# Patient Record
Sex: MALE | Race: WHITE | NOT HISPANIC OR LATINO | URBAN - METROPOLITAN AREA
[De-identification: names, ages, dates, MRNs, and addresses within clinical notes are randomized per-mention and may not be internally consistent; named-entity substitution may affect disease eponyms.]

---

## 2017-04-24 ENCOUNTER — OFFICE VISIT - RIVER FALLS (OUTPATIENT)
Dept: FAMILY MEDICINE | Facility: CLINIC | Age: 52
End: 2017-04-24

## 2017-04-25 LAB
CHOLEST SERPL-MCNC: 141 MG/DL (ref 125–200)
CHOLEST/HDLC SERPL: 2.2 {RATIO}
GLUCOSE BLD-MCNC: 98 MG/DL (ref 65–99)
HDLC SERPL-MCNC: 64 MG/DL
LDLC SERPL CALC-MCNC: 67 MG/DL
NONHDLC SERPL-MCNC: 77 MG/DL
TRIGL SERPL-MCNC: 48 MG/DL

## 2017-05-04 ENCOUNTER — OFFICE VISIT - RIVER FALLS (OUTPATIENT)
Dept: FAMILY MEDICINE | Facility: CLINIC | Age: 52
End: 2017-05-04

## 2017-05-04 ENCOUNTER — COMMUNICATION - RIVER FALLS (OUTPATIENT)
Dept: FAMILY MEDICINE | Facility: CLINIC | Age: 52
End: 2017-05-04

## 2017-05-04 ASSESSMENT — MIFFLIN-ST. JEOR: SCORE: 1560.88

## 2017-05-05 LAB — HBA1C MFR BLD: 6.1 %

## 2017-06-06 ENCOUNTER — OFFICE VISIT - RIVER FALLS (OUTPATIENT)
Dept: FAMILY MEDICINE | Facility: CLINIC | Age: 52
End: 2017-06-06

## 2017-06-06 ASSESSMENT — MIFFLIN-ST. JEOR: SCORE: 1539.11

## 2017-07-19 ENCOUNTER — AMBULATORY - HEALTHEAST (OUTPATIENT)
Dept: CARDIOLOGY | Facility: CLINIC | Age: 52
End: 2017-07-19

## 2017-07-19 ENCOUNTER — RECORDS - HEALTHEAST (OUTPATIENT)
Dept: ADMINISTRATIVE | Facility: OTHER | Age: 52
End: 2017-07-19

## 2017-07-25 ENCOUNTER — RECORDS - HEALTHEAST (OUTPATIENT)
Dept: ADMINISTRATIVE | Facility: OTHER | Age: 52
End: 2017-07-25

## 2017-07-25 ENCOUNTER — COMMUNICATION - HEALTHEAST (OUTPATIENT)
Dept: TELEHEALTH | Facility: CLINIC | Age: 52
End: 2017-07-25

## 2017-07-25 ENCOUNTER — OFFICE VISIT - RIVER FALLS (OUTPATIENT)
Dept: FAMILY MEDICINE | Facility: CLINIC | Age: 52
End: 2017-07-25

## 2017-07-26 ENCOUNTER — OFFICE VISIT - HEALTHEAST (OUTPATIENT)
Dept: CARDIOLOGY | Facility: TELEHEALTH | Age: 52
End: 2017-07-26

## 2017-07-26 DIAGNOSIS — R93.1 ELEVATED CORONARY ARTERY CALCIUM SCORE: ICD-10-CM

## 2017-07-26 DIAGNOSIS — I25.10 CORONARY ARTERY DISEASE INVOLVING NATIVE CORONARY ARTERY WITHOUT ANGINA PECTORIS: ICD-10-CM

## 2017-07-26 RX ORDER — CHLORAL HYDRATE 500 MG
2 CAPSULE ORAL DAILY
Status: SHIPPED | COMMUNITY
Start: 2017-07-26

## 2017-07-26 RX ORDER — ATORVASTATIN CALCIUM 80 MG/1
80 TABLET, FILM COATED ORAL AT BEDTIME
Status: SHIPPED | COMMUNITY
Start: 2017-07-26

## 2017-07-26 RX ORDER — UBIDECARENONE 200 MG
200 CAPSULE ORAL DAILY
Status: SHIPPED | COMMUNITY
Start: 2017-07-26

## 2017-07-26 RX ORDER — SODIUM PHOSPHATE,MONO-DIBASIC 19G-7G/118
1 ENEMA (ML) RECTAL DAILY
Status: SHIPPED | COMMUNITY
Start: 2017-07-26

## 2017-07-26 ASSESSMENT — MIFFLIN-ST. JEOR: SCORE: 1544.79

## 2017-09-11 ENCOUNTER — HOSPITAL ENCOUNTER (OUTPATIENT)
Dept: CARDIOLOGY | Facility: CLINIC | Age: 52
Discharge: HOME | End: 2017-09-11
Attending: INTERNAL MEDICINE

## 2017-09-11 DIAGNOSIS — I25.10 CORONARY ARTERY DISEASE INVOLVING NATIVE CORONARY ARTERY WITHOUT ANGINA PECTORIS: ICD-10-CM

## 2017-09-11 DIAGNOSIS — R93.1 ELEVATED CORONARY ARTERY CALCIUM SCORE: ICD-10-CM

## 2017-09-11 LAB
CV STRESS MAX HR HE: 150
ECHO EJECTION FRACTION ESTIMATED: 60 %
STRESS ECHO BASELINE BP: NORMAL M/S
STRESS ECHO BASELINE HR: 65 BPM
STRESS ECHO CALCULATED PERCENT HR: 89 %
STRESS ECHO LAST STRESS BP: NORMAL M/S
STRESS ECHO POST ESTIMATED WORKLOAD: 12.9 METS
STRESS ECHO POST EXERCISE DUR MIN: 12 MIN
STRESS ECHO POST EXERCISE DUR SEC: 30 SEC
STRESS ECHO TARGET HR: 144

## 2017-10-16 ENCOUNTER — AMBULATORY - RIVER FALLS (OUTPATIENT)
Dept: FAMILY MEDICINE | Facility: CLINIC | Age: 52
End: 2017-10-16

## 2018-04-23 ENCOUNTER — AMBULATORY - RIVER FALLS (OUTPATIENT)
Dept: FAMILY MEDICINE | Facility: CLINIC | Age: 53
End: 2018-04-23

## 2018-04-24 LAB
CHOLEST SERPL-MCNC: 112 MG/DL
CHOLEST/HDLC SERPL: 2 {RATIO}
GLUCOSE BLD-MCNC: 105 MG/DL (ref 65–99)
HBA1C MFR BLD: 5.8 %
HDLC SERPL-MCNC: 57 MG/DL
LDLC SERPL CALC-MCNC: 42 MG/DL
NONHDLC SERPL-MCNC: 55 MG/DL
TRIGL SERPL-MCNC: 47 MG/DL

## 2018-05-08 ENCOUNTER — OFFICE VISIT - RIVER FALLS (OUTPATIENT)
Dept: FAMILY MEDICINE | Facility: CLINIC | Age: 53
End: 2018-05-08

## 2018-05-08 ASSESSMENT — MIFFLIN-ST. JEOR: SCORE: 1557.25

## 2018-05-09 LAB — PSA SERPL-MCNC: 1 NG/ML

## 2018-06-12 ENCOUNTER — OFFICE VISIT - RIVER FALLS (OUTPATIENT)
Dept: FAMILY MEDICINE | Facility: CLINIC | Age: 53
End: 2018-06-12

## 2018-06-14 ENCOUNTER — OFFICE VISIT - RIVER FALLS (OUTPATIENT)
Dept: FAMILY MEDICINE | Facility: CLINIC | Age: 53
End: 2018-06-14

## 2018-06-25 ENCOUNTER — OFFICE VISIT - RIVER FALLS (OUTPATIENT)
Dept: FAMILY MEDICINE | Facility: CLINIC | Age: 53
End: 2018-06-25

## 2018-10-18 ENCOUNTER — AMBULATORY - RIVER FALLS (OUTPATIENT)
Dept: FAMILY MEDICINE | Facility: CLINIC | Age: 53
End: 2018-10-18

## 2019-05-02 ENCOUNTER — AMBULATORY - RIVER FALLS (OUTPATIENT)
Dept: FAMILY MEDICINE | Facility: CLINIC | Age: 54
End: 2019-05-02

## 2019-05-03 ENCOUNTER — COMMUNICATION - RIVER FALLS (OUTPATIENT)
Dept: FAMILY MEDICINE | Facility: CLINIC | Age: 54
End: 2019-05-03

## 2019-05-03 LAB
CHOLEST SERPL-MCNC: 117 MG/DL
CHOLEST/HDLC SERPL: 1.8 {RATIO}
GLUCOSE BLD-MCNC: 97 MG/DL (ref 65–99)
HBA1C MFR BLD: 5.9 %
HDLC SERPL-MCNC: 66 MG/DL
LDLC SERPL CALC-MCNC: 38 MG/DL
NONHDLC SERPL-MCNC: 51 MG/DL
TRIGL SERPL-MCNC: 45 MG/DL

## 2019-05-10 ENCOUNTER — COMMUNICATION - HEALTHEAST (OUTPATIENT)
Dept: ADMINISTRATIVE | Facility: CLINIC | Age: 54
End: 2019-05-10

## 2019-05-10 ENCOUNTER — OFFICE VISIT - RIVER FALLS (OUTPATIENT)
Dept: FAMILY MEDICINE | Facility: CLINIC | Age: 54
End: 2019-05-10

## 2019-05-10 ASSESSMENT — MIFFLIN-ST. JEOR: SCORE: 1560.88

## 2019-06-03 ENCOUNTER — COMMUNICATION - RIVER FALLS (OUTPATIENT)
Dept: FAMILY MEDICINE | Facility: CLINIC | Age: 54
End: 2019-06-03

## 2019-06-24 ENCOUNTER — AMBULATORY - HEALTHEAST (OUTPATIENT)
Dept: CARDIOLOGY | Facility: CLINIC | Age: 54
End: 2019-06-24

## 2019-06-24 ENCOUNTER — RECORDS - HEALTHEAST (OUTPATIENT)
Dept: ADMINISTRATIVE | Facility: OTHER | Age: 54
End: 2019-06-24

## 2019-06-26 ENCOUNTER — OFFICE VISIT - HEALTHEAST (OUTPATIENT)
Dept: CARDIOLOGY | Facility: TELEHEALTH | Age: 54
End: 2019-06-26

## 2019-06-26 DIAGNOSIS — R93.1 ELEVATED CORONARY ARTERY CALCIUM SCORE: ICD-10-CM

## 2019-06-26 RX ORDER — ALFUZOSIN HYDROCHLORIDE 10 MG/1
10 TABLET, EXTENDED RELEASE ORAL DAILY
Status: SHIPPED | COMMUNITY
Start: 2018-05-08

## 2019-06-26 RX ORDER — SIMVASTATIN 20 MG
20 TABLET ORAL DAILY
Status: SHIPPED | COMMUNITY
Start: 2019-06-26

## 2019-06-26 ASSESSMENT — MIFFLIN-ST. JEOR: SCORE: 1567.47

## 2019-10-03 ENCOUNTER — AMBULATORY - RIVER FALLS (OUTPATIENT)
Dept: FAMILY MEDICINE | Facility: CLINIC | Age: 54
End: 2019-10-03

## 2019-10-03 ENCOUNTER — COMMUNICATION - RIVER FALLS (OUTPATIENT)
Dept: FAMILY MEDICINE | Facility: CLINIC | Age: 54
End: 2019-10-03

## 2019-12-03 ENCOUNTER — OFFICE VISIT - RIVER FALLS (OUTPATIENT)
Dept: FAMILY MEDICINE | Facility: CLINIC | Age: 54
End: 2019-12-03

## 2019-12-03 ASSESSMENT — MIFFLIN-ST. JEOR: SCORE: 1559.07

## 2019-12-04 ENCOUNTER — COMMUNICATION - RIVER FALLS (OUTPATIENT)
Dept: FAMILY MEDICINE | Facility: CLINIC | Age: 54
End: 2019-12-04

## 2019-12-04 LAB
BUN SERPL-MCNC: 16 MG/DL (ref 7–25)
BUN/CREAT RATIO - HISTORICAL: ABNORMAL (ref 6–22)
CALCIUM SERPL-MCNC: 9.4 MG/DL (ref 8.6–10.3)
CHLORIDE BLD-SCNC: 103 MMOL/L (ref 98–110)
CO2 SERPL-SCNC: 29 MMOL/L (ref 20–32)
CREAT SERPL-MCNC: 0.73 MG/DL (ref 0.7–1.33)
EGFRCR SERPLBLD CKD-EPI 2021: 106 ML/MIN/1.73M2
GLUCOSE BLD-MCNC: 109 MG/DL (ref 65–99)
POTASSIUM BLD-SCNC: 4.2 MMOL/L (ref 3.5–5.3)
SODIUM SERPL-SCNC: 138 MMOL/L (ref 135–146)

## 2020-02-15 ENCOUNTER — OFFICE VISIT - RIVER FALLS (OUTPATIENT)
Dept: FAMILY MEDICINE | Facility: CLINIC | Age: 55
End: 2020-02-15

## 2020-02-15 ASSESSMENT — MIFFLIN-ST. JEOR: SCORE: 1573.58

## 2020-02-28 ENCOUNTER — AMBULATORY - RIVER FALLS (OUTPATIENT)
Dept: FAMILY MEDICINE | Facility: CLINIC | Age: 55
End: 2020-02-28

## 2020-03-20 ENCOUNTER — COMMUNICATION - RIVER FALLS (OUTPATIENT)
Dept: FAMILY MEDICINE | Facility: CLINIC | Age: 55
End: 2020-03-20

## 2020-05-22 ENCOUNTER — OFFICE VISIT - RIVER FALLS (OUTPATIENT)
Dept: FAMILY MEDICINE | Facility: CLINIC | Age: 55
End: 2020-05-22

## 2020-05-22 ASSESSMENT — MIFFLIN-ST. JEOR: SCORE: 1554.53

## 2020-05-23 LAB
CHOLEST SERPL-MCNC: 121 MG/DL
CHOLEST/HDLC SERPL: 1.8 {RATIO}
HBA1C MFR BLD: 6 %
HDLC SERPL-MCNC: 66 MG/DL
LDLC SERPL CALC-MCNC: 42 MG/DL
NONHDLC SERPL-MCNC: 55 MG/DL
TRIGL SERPL-MCNC: 46 MG/DL

## 2020-05-24 ENCOUNTER — COMMUNICATION - RIVER FALLS (OUTPATIENT)
Dept: FAMILY MEDICINE | Facility: CLINIC | Age: 55
End: 2020-05-24

## 2020-09-16 ENCOUNTER — AMBULATORY - RIVER FALLS (OUTPATIENT)
Dept: FAMILY MEDICINE | Facility: CLINIC | Age: 55
End: 2020-09-16

## 2020-12-11 ENCOUNTER — OFFICE VISIT - RIVER FALLS (OUTPATIENT)
Dept: FAMILY MEDICINE | Facility: CLINIC | Age: 55
End: 2020-12-11

## 2020-12-11 ASSESSMENT — MIFFLIN-ST. JEOR: SCORE: 1561.79

## 2021-05-30 NOTE — PROGRESS NOTES
Patient with known history of calcium score which is elevated most of which is in the left anterior descending coronary artery.  He had a stress echo bushra Wheatley 2 years ago which is unremarkable.    He has been feeling well.  He averages 15,000 steps a day primarily by walking his dogs.  Is not been working out on a regular basis but has had difficulties with his Achilles tendon and is considering surgery for it.  He denies orthopnea, paroxysmal nocturnal dyspnea, peripheral edema, syncope or near syncopal episodes.  He has no chest discomfort or chest tightness with or without activity.    LDL cholesterol is quite low, measuring 38 recently.  He has no side effects from the statins that he is aware of.  Her graph vital signs are reviewed.    Chest is clear to auscultation.    On cardiovascular exam there is no increased secondary to pressure nor is her hepatojugular reflux.  There is an S1 and S2 without murmur clicks or rubs.  Radial brachial pulses are easily palpable and symmetric as of the carotid pulses and there are no carotid bruits.  Posterior tibial pulses are easily palpable and symmetric.    No abdominal bruits.    Extremities no peripheral edema.    Patient is doing well from a cardiovascular standpoint.  We did talk about the Mediterranean diet which he and his spouse have researched and are ready to undertake with just have not gotten started.  I commended him and recommended he do this.  I also gave him the name of the book called the signs of a long life by Dr. My Luciano which incorporates many of the strategies for preventing cardiac events and a long life.  I think this would be a useful book for him.    I am not changing his medications today.  We will see him back in 2 years, but of course would be happy to see him sooner if questions or problems arise.    Thank you for allowing us to participate in his care

## 2021-05-31 VITALS — HEIGHT: 66 IN | WEIGHT: 168 LBS | BODY MASS INDEX: 27 KG/M2

## 2021-06-03 VITALS — BODY MASS INDEX: 27.8 KG/M2 | HEIGHT: 66 IN | WEIGHT: 173 LBS

## 2021-06-16 PROBLEM — I25.10 CORONARY ARTERY DISEASE INVOLVING NATIVE CORONARY ARTERY WITHOUT ANGINA PECTORIS: Status: ACTIVE | Noted: 2017-07-26

## 2021-06-16 PROBLEM — R93.1 ELEVATED CORONARY ARTERY CALCIUM SCORE: Status: ACTIVE | Noted: 2017-07-26

## 2021-06-19 NOTE — LETTER
Letter by Andrea Ugarte MD at      Author: Andrea Ugarte MD Service: -- Author Type: --    Filed:  Encounter Date: 5/10/2019 Status: (Other)         Damon Davis  [address] [facility] [address]      May 10, 2019      Dear Maico,    This letter is to remind you that you will be due for your follow up appointment with Dr. Andrea Ugarte  . To help ensure you are in the best health possible, a regular follow-up with your cardiologist is essential.     Please call our Patient Scheduling Line at [phone] to schedule your appointment at your earliest convenience.  If you have recently scheduled an appointment, please disregard this letter.    We look forward to seeing you again. As always, we are available at the number  above for any questions or concerns you may have.      Sincerely,     The Physicians and Staff of [facility]

## 2021-06-25 NOTE — PROGRESS NOTES
Progress Notes by Andrea Ugarte MD at 7/26/2017  8:30 AM     Author: Andrea Ugarte MD Service: -- Author Type: Physician    Filed: 7/26/2017  9:18 AM Encounter Date: 7/26/2017 Status: Signed    : Andrea Ugarte MD (Physician)           Click to link to [facility] HEART CARE NOTE    Thank you, Dr. Sandra, for asking us to see Maico Cerda at the [facility].      Assessment/Recommendations   Patient with elevated calcium score which is increased over the last 9 years.  It is not dramatically increased and there is some suggestion that statins may increase the calcium score in patients with known plaque.  The calcium is all located in the left anterior descending coronary artery.  He is not having any symptoms but admits to being more sedentary and his exercise is brief walks with his 2 dogs.    We discussed risk factor modification including statins, aspirin, no tobacco use, all of which he is currently doing.  We talked about increasing his physical activity and I recommended more vigorous exercise for 30 minutes at least 5 times a week.  We also talked about diet and I have asked him to reduce his carbohydrates by 50% and substitute protein.  I think this would result in better blood sugar as well as some weight loss.    Because of the same all in the left anterior descending coronary artery, I have recommended that we do a stress echocardiogram as it is been several years since he has had a stress test.  This will also make me more comfortable and pushing him on the exercise.    If he remains stable and his stress echocardiogram is unremarkable, I will see him back in 2 years but a course would be happy to see him sooner if questions or problems arise.         History of Present Illness    Mr. Maico Cerda is a 51 y.o. male with known high calcium score and family history of premature coronary artery disease.  His father had bypass surgery in  "his 50s.  His maternal grandfather also had coronary artery disease in his maternal uncle had significant cardiac event at age 50.  Both of his parents are treated for hypertension.  The patient has never had extreme hyperlipidemia and is been on simvastatin for several years.  He has never smoked cigarettes, is \"prediabetic\".  Has not been treated for hypertension.  Blood pressures generally run well.    He had a calcium score in 2008 which was in the 150s and a repeat recently which was in the 180s.  The calcium was in the left anterior descending coronary artery.    He has not had any symptoms.  He walks his dogs a half a mile to a mile a couple of times a day and a shorter walk at lunchtime.  He does not have any symptoms with walking.  He does yard work without symptoms.  In the past he is done a treadmill and an exercise bike more vigorously but has not done much of that since January.  He is just gotten out of the habit.    He denies orthopnea, paroxysmal nocturnal dyspnea, peripheral edema, syncope, near syncope and has no history of rheumatic fever, cerebrovascular accident, or TIA.  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  He is  and has 1 daughter who will be attending college this fall.         Physical Examination Review of Systems   Vitals:    07/26/17 0824   BP: 100/62   Pulse: 68   Resp: 16     Body mass index is 27.12 kg/(m^2).  Wt Readings from Last 3 Encounters:   07/26/17 168 lb (76.2 kg)     General Appearance:   Alert, cooperative and in no acute distress.   ENT/Mouth: Oral mucuos membranes pink and moist .      EYES:  No scleral icterus. No Xanthelasma.    Neck: JVP normal. No Hepatojugular reflux. Thyroid not visualized   Chest/Lungs:   Lungs are clear to auscultation, equal chest wall expansion    Cardiovascular:   S1, S2 without murmur ,clicks or rubs. Brachial, radial and posterior tibial pulses are intact and symetric. No carotid bruits noted   Abdomen:  Nontender. BS+. " No bruits.      Extremities: No cyanosis, clubbing or edema   Skin: no xanthelasma, warm.    Psych: Appropriate affect.   Neurologic: normal gait, normal  bilateral, no tremors        General: WNL  Eyes: WNL  Ears/Nose/Throat: WNL  Lungs: WNL  Heart: WNL  Stomach: WNL  Bladder: WNL  Muscle/Joints: WNL  Skin: WNL  Nervous System: WNL  Mental Health: WNL     Blood: WNL     Medical History  Surgical History Family History Social History   No past medical history on file. No past surgical history on file. Family History   Problem Relation Age of Onset   ? Hypertension Mother    ? CABG Father     Social History     Social History   ? Marital status:      Spouse name: N/A   ? Number of children: N/A   ? Years of education: N/A     Occupational History   ? Not on file.     Social History Main Topics   ? Smoking status: Never Smoker   ? Smokeless tobacco: Not on file   ? Alcohol use Not on file   ? Drug use: Not on file   ? Sexual activity: Not on file     Other Topics Concern   ? Not on file     Social History Narrative   ? No narrative on file          Medications  Allergies   Current Outpatient Prescriptions   Medication Sig Dispense Refill   ? aspirin 81 MG EC tablet Take 81 mg by mouth daily.     ? atorvastatin (LIPITOR) 80 MG tablet Take 80 mg by mouth at bedtime.     ? coenzyme Q10 (CO Q-10) 200 mg capsule Take 200 mg by mouth daily.     ? glucosamine-chondroitin 500-400 mg cap Take 1 capsule by mouth daily.     ? magnesium oxide 250 mg Tab Take 250 mg by mouth daily.     ? MULTIVITAMIN ORAL Take 1 tablet by mouth daily.     ? OMEGA-3/DHA/EPA/FISH OIL (FISH OIL-OMEGA-3 FATTY ACIDS) 300-1,000 mg capsule Take 2 g by mouth daily.     ? triamcinolone (NASACORT) 55 mcg nasal inhaler 2 sprays into each nostril as needed.       No current facility-administered medications for this visit.       No Known Allergies      Lab Results    Chemistry/lipid CBC Cardiac Enzymes/BNP/TSH/INR   No results found for: CHOL, HDL,  LDLCALC, TRIG, CREATININE, BUN, K, NA, CL, CO2 No results found for: WBC, HGB, HCT, MCV, PLT No results found for: CKTOTAL, CKMB, CKMBINDEX, TROPONINI, BNP, TSH, INR

## 2021-07-13 ENCOUNTER — COMMUNICATION - RIVER FALLS (OUTPATIENT)
Dept: FAMILY MEDICINE | Facility: CLINIC | Age: 56
End: 2021-07-13

## 2022-02-11 VITALS — HEART RATE: 55 BPM | DIASTOLIC BLOOD PRESSURE: 68 MMHG | SYSTOLIC BLOOD PRESSURE: 110 MMHG

## 2022-02-11 VITALS
SYSTOLIC BLOOD PRESSURE: 162 MMHG | TEMPERATURE: 98 F | HEART RATE: 54 BPM | HEART RATE: 58 BPM | BODY MASS INDEX: 28.73 KG/M2 | DIASTOLIC BLOOD PRESSURE: 73 MMHG | SYSTOLIC BLOOD PRESSURE: 126 MMHG | DIASTOLIC BLOOD PRESSURE: 82 MMHG | TEMPERATURE: 98.2 F | WEIGHT: 178 LBS

## 2022-02-12 VITALS
WEIGHT: 173.6 LBS | SYSTOLIC BLOOD PRESSURE: 127 MMHG | BODY MASS INDEX: 27.9 KG/M2 | HEIGHT: 66 IN | HEART RATE: 58 BPM | DIASTOLIC BLOOD PRESSURE: 80 MMHG | TEMPERATURE: 97.5 F

## 2022-02-12 VITALS
DIASTOLIC BLOOD PRESSURE: 75 MMHG | HEIGHT: 66 IN | BODY MASS INDEX: 28.48 KG/M2 | BODY MASS INDEX: 27.97 KG/M2 | TEMPERATURE: 97.4 F | WEIGHT: 177.2 LBS | HEART RATE: 67 BPM | HEIGHT: 66 IN | SYSTOLIC BLOOD PRESSURE: 122 MMHG | WEIGHT: 174 LBS | HEART RATE: 66 BPM | SYSTOLIC BLOOD PRESSURE: 115 MMHG | OXYGEN SATURATION: 98 % | DIASTOLIC BLOOD PRESSURE: 72 MMHG | TEMPERATURE: 97.1 F

## 2022-02-12 VITALS
SYSTOLIC BLOOD PRESSURE: 121 MMHG | HEART RATE: 57 BPM | TEMPERATURE: 97.2 F | HEIGHT: 66 IN | BODY MASS INDEX: 28.03 KG/M2 | WEIGHT: 174.4 LBS | DIASTOLIC BLOOD PRESSURE: 78 MMHG

## 2022-02-12 VITALS
WEIGHT: 174.4 LBS | DIASTOLIC BLOOD PRESSURE: 66 MMHG | BODY MASS INDEX: 28.03 KG/M2 | DIASTOLIC BLOOD PRESSURE: 60 MMHG | HEART RATE: 58 BPM | SYSTOLIC BLOOD PRESSURE: 108 MMHG | TEMPERATURE: 97.2 F | SYSTOLIC BLOOD PRESSURE: 104 MMHG | HEIGHT: 66 IN

## 2022-02-12 VITALS
HEART RATE: 66 BPM | SYSTOLIC BLOOD PRESSURE: 127 MMHG | HEIGHT: 66 IN | WEIGHT: 174.6 LBS | BODY MASS INDEX: 28.06 KG/M2 | DIASTOLIC BLOOD PRESSURE: 77 MMHG

## 2022-02-12 VITALS
HEIGHT: 66 IN | WEIGHT: 169.6 LBS | BODY MASS INDEX: 27.26 KG/M2 | TEMPERATURE: 97.6 F | HEART RATE: 62 BPM | DIASTOLIC BLOOD PRESSURE: 72 MMHG | SYSTOLIC BLOOD PRESSURE: 111 MMHG

## 2022-02-12 VITALS
TEMPERATURE: 97.1 F | SYSTOLIC BLOOD PRESSURE: 121 MMHG | WEIGHT: 173 LBS | HEART RATE: 57 BPM | HEIGHT: 66 IN | DIASTOLIC BLOOD PRESSURE: 74 MMHG | BODY MASS INDEX: 27.8 KG/M2

## 2022-02-15 NOTE — PROGRESS NOTES
Patient:   YASMIN SILVESTRE            MRN: [id number]            FIN: [id number]               Age:   54 years     Sex:  Male     :  1965   Associated Diagnoses:   Skin infection   Author:   Sarahy Rogers      Visit Information      Date of Service: 02/15/2020 08:09 am  Performing Location: [facility]  Encounter#: [id number]      Primary Care Provider (PCP):  David Sandra MD, NPI# [id number]      Referring Provider:  Sarahy Rogers    NPI# [id number]      Chief Complaint   2/15/2020 8:13 AM CST    Possible infected surgical site left foot.  Surgery was 20 for achelles tendon repair        History of Present Illness   Had left achilles tendon repair 8 weeks ago  2 weeks later some sutures removed  2 weeks ago final sutures removed over the site where there were some scabs  he is concerned there is an infection as it is draining pus  no increased pain  he is full wt bearing and has been allowed to be in a pool and tube, tho he has only been showering  no fever  surgeon Dr Martin from [facility]  here on Sat at urgent clinic      Health Status   Allergies:    Allergic Reactions (Selected)  No known allergies   Medications:  (Selected)   Prescriptions  Prescribed  Flomax 0.4 mg oral capsule: = 1 cap(s) ( 0.4 mg ), Oral, daily, # 90 cap(s), 1 Refill(s), Type: Maintenance, Pharmacy: [facility], 1 cap(s) Oral daily  aspirin 81 mg oral tablet: 1 tab(s) ( 81 mg ), PO, Daily, # 30 tab(s), 0 Refill(s), Type: Soft Stop, OTC (Rx)  atorvastatin 80 mg oral tablet: = 1 tab(s) ( 80 mg ), Oral, daily, # 90 tab(s), 1 Refill(s), Type: Maintenance, Pharmacy: [facility], 1 tab(s) Oral daily  cephalexin 500 mg oral capsule: = 1 cap(s) ( 500 mg ), Oral, tid, x 7 day(s), # 21 cap(s), 0 Refill(s), Type: Acute, Pharmacy: [id number], 1 cap(s) Oral tid,x7 day(s)  Documented Medications  Documented  Coenzyme Q10: ( 200 mg ), po, daily, cap(s), 0  Refill(s), Type: Maintenance  Fish Oil: 1 tab(s), po, bid, tab(s), 0 Refill(s), Type: Maintenance  Melatonin 3 mg oral tablet: = 1 tab(s) ( 3 mg ), Oral, 0 Refill(s), Type: Maintenance  Multiple Vitamins oral tablet: 1 tab(s), po, daily, 0 Refill(s), Type: Maintenance  Nasacort: daily, 0 Refill(s), Type: Maintenance  glucosamine: PO, Daily, 0   Problem list:    All Problems  ASHD (arteriosclerotic heart disease) / SNOMED CT 61042256 / Confirmed  HLD (hyperlipidemia) / SNOMED CT 517088345 / Confirmed  Joint pain / SNOMED CT 80368938 / Confirmed  Patient Comment: Tightness & pain in achilles tendons  Kidney stone / SNOMED CT 775908950 / Confirmed  Prediabetes / SNOMED CT 77127608 / Confirmed  Seasonal allergies / SNOMED CT 560492899 / Confirmed  Tennis elbow / SNOMED CT 603973738 / Confirmed  Urinary incontinence / SNOMED CT 2500862670 / Confirmed  Managing Provider: -  Patient Comment: I would like to discuss this at my upcoming physical.  Resolved: History of chicken pox / SNOMED CT 527437624  Canceled: Hx of abnormal cardiac calcium score      Histories   Past Medical History:    Active  Tennis elbow (749431394)  ASHD (arteriosclerotic heart disease) (98045820)  HLD (hyperlipidemia) (360940347)  Seasonal allergies (341753215)  Resolved  History of chicken pox (382351037):  Resolved.   Family History:    Diabetes mellitus  Father (Randy)  Hypertension  Mother (Erika)  Heart disease  Mother (Erika)  Cancer of colon  Grandfather (P)  Comments:  4/19/2011 7:27 PM CDT - Ebla Lindsay  80s  Crohn's disease  Sister (Anjelica)  MI - Myocardial infarction  Uncle (P)  Comments:  4/19/2011 7:27 PM CDT - Elba Lindsay  50s  Father (Randy)  Comments:  4/19/2011 7:29 PM CDT - Elba Lindsay  early 50s  ASHD - Atherosclerotic heart disease  Father (Randy)     Procedure history:    Colonoscopy (SNOMED CT 211939146) performed by Nacho Villalobos MD on 7/25/2017 at 51 Years.  Comments:  8/1/2017 10:38 AM RENETTA Vega RN,  Silvana  Indication: screening  Sedation: fentanyl and versed  Findings: normal colonoscopy  F/U: 10 years  Cardiac computed tomography for calcium scoring (SNOMED CT 5159341358) on 5/17/2017 at 51 Years.  Comments:  5/19/2017 2:23 PM CDT - Marly Harden  Total coronary calcium score is 182  Excision of sebaceous cyst (SNOMED CT 109853710) performed by Kyle Pike MD on 6/16/2016 at 50 Years.  Comments:  6/21/2016 1:14 PM CDT - Maisha Mchugh  Chest.  CT Cardiac Calcium Score on 11/17/2008 at 43 Years.  Comments:  5/7/2013 9:10 AM CDT - Brsieida Romero  Date of test: 11/17/08.  Score: 159   Social History:        Alcohol Assessment            Current, Beer (12 oz), 1 time per week, 1 drinks/episode average.  2 drinks/episode maximum.      Tobacco Assessment            Never      Substance Abuse Assessment            Never      Employment and Education Assessment            Employed, Work/School description: Insurance Claim Supervisor.      Home and Environment Assessment            Marital status: .  Spouse/Partner name: Corina pandya.  Risks in environment: owns secured gun.      Nutrition and Health Assessment            Type of diet: Regular.      Exercise and Physical Activity Assessment            Exercise frequency: Daily.  Exercise type: Walking.      Sexual Assessment            Sexually active: Yes.        Physical Examination   Vital Signs   2/15/2020 8:13 AM CST Temperature Tympanic 97.4 DegF  LOW    Peripheral Pulse Rate 67 bpm    Systolic Blood Pressure 122 mmHg    Diastolic Blood Pressure 72 mmHg    Mean Arterial Pressure 89 mmHg    Oxygen Saturation 98 %      Measurements from flowsheet : Measurements   2/15/2020 8:13 AM CST Height Measured - Standard 65.5 in    Weight Measured - Standard 177.2 lb    BSA 1.93 m2    Body Mass Index 29.04 kg/m2  HI      General:  Alert and oriented, AO NAD,  boot is off  he is on the bad  exam of bandaid removed from achilles area has some yellow  drainage  there is a  a 5mm x4mm open soft yellow patch on the back oft he upper heel left side  no swelling, some tenderness with palpation.       Impression and Plan   Diagnosis     Skin infection (YOB52-VP L08.9).     Plan:  I doubt the infection is deep in the tendon, just on the surface  will soak in ebsom salts 10 min bid and start antibiotic and he should call his surgeon on Monday and let them know what was done  expressus understanding.    Patient Instructions:       Counseled: Patient, Regarding diagnosis, Regarding treatment, Regarding medications, Verbalized understanding.    Orders     Orders (Selected)   Prescriptions  Prescribed  cephalexin 500 mg oral capsule: = 1 cap(s) ( 500 mg ), Oral, tid, x 7 day(s), # 21 cap(s), 0 Refill(s), Type: Acute, [id number], 1 cap(s) Oral tid,x7 day(s).

## 2022-02-15 NOTE — NURSING NOTE
Comprehensive Intake Entered On:  5/10/2019 9:09 AM CDT    Performed On:  5/10/2019 9:04 AM CDT by Ivelisse Peck CMA               Summary   Chief Complaint :   Yearly px   Weight Measured :   174.4 lb(Converted to: 174 lb 6 oz, 79.11 kg)    Height Measured :   65.5 in(Converted to: 5 ft 5 in, 166.37 cm)    Body Mass Index :   28.58 kg/m2 (HI)    Body Surface Area :   1.91 m2   Systolic Blood Pressure :   108 mmHg   Diastolic Blood Pressure :   60 mmHg   Mean Arterial Pressure :   76 mmHg   Peripheral Pulse Rate :   58 bpm (LOW)    BP Site :   Right arm   Pulse Site :   Radial artery   BP Method :   Manual   HR Method :   Manual   Temperature Tympanic :   97.2 DegF(Converted to: 36.2 DegC)  (LOW)    Ivelisse Peck CMA - 5/10/2019 9:04 AM CDT   Health Status   Allergies Verified? :   Yes   Medication History Verified? :   Yes   Medical History Verified? :   No   Pre-Visit Planning Status :   Completed   Tobacco Use? :   Never smoker   Ivelisse Peck CMA - 5/10/2019 9:04 AM CDT   Meds / Allergies   (As Of: 5/10/2019 9:09:21 AM CDT)   Allergies (Active)   No known allergies  Estimated Onset Date:   Unspecified ; Created By:   Nora Cerda CMA; Reaction Status:   Active ; Category:   Drug ; Substance:   No known allergies ; Type:   Allergy ; Updated By:   Nora Cerda CMA; Reviewed Date:   5/10/2019 9:06 AM CDT        Medication List   (As Of: 5/10/2019 9:09:21 AM CDT)   Prescription/Discharge Order    alfuzosin  :   alfuzosin ; Status:   Prescribed ; Ordered As Mnemonic:   alfuzosin 10 mg oral tablet, extended release ; Simple Display Line:   1 tab(s), Oral, daily, due 5/8/19 for annual physical, 90 tab(s), 0 Refill(s) ; Ordering Provider:   Dvaid Sandra MD; Catalog Code:   alfuzosin ; Order Dt/Tm:   4/10/2019 10:41:49 AM          aspirin  :   aspirin ; Status:   Prescribed ; Ordered As Mnemonic:   aspirin 81 mg oral tablet ; Simple Display Line:   81 mg, 1 tab(s), PO, Daily, 30 tab(s) ; Ordering  Provider:   David Sandra MD; Catalog Code:   aspirin ; Order Dt/Tm:   5/31/2012 3:22:03 PM          atorvastatin  :   atorvastatin ; Status:   Prescribed ; Ordered As Mnemonic:   atorvastatin 80 mg oral tablet ; Simple Display Line:   1 tab(s), Oral, daily, 30 tab(s), 0 Refill(s) ; Ordering Provider:   David Sandra MD; Catalog Code:   atorvastatin ; Order Dt/Tm:   5/7/2019 9:17:12 AM          ondansetron  :   ondansetron ; Status:   Completed ; Ordered As Mnemonic:   Zofran ODT 4 mg oral tablet, disintegrating ; Simple Display Line:   4 mg, 1 tab(s), po, prn, 12 tab(s), 0 Refill(s) ; Ordering Provider:   Raghu Daly MD; Catalog Code:   ondansetron ; Order Dt/Tm:   6/12/2018 10:41:17 AM          oxyCODONE  :   oxyCODONE ; Status:   Completed ; Ordered As Mnemonic:   oxyCODONE 5 mg oral capsule ; Simple Display Line:   5 mg, 1 cap(s), po, q6 hrs, PRN: as needed for pain, 20 cap(s), 0 Refill(s) ; Ordering Provider:   David Sandra MD; Catalog Code:   oxyCODONE ; Order Dt/Tm:   6/14/2018 11:40:17 AM            Home Meds    glucosamine  :   glucosamine ; Status:   Documented ; Ordered As Mnemonic:   glucosamine ; Simple Display Line:   PO, Daily ; Catalog Code:   glucosamine ; Order Dt/Tm:   2/3/2010 1:36:13 PM          melatonin  :   melatonin ; Status:   Documented ; Ordered As Mnemonic:   Melatonin 3 mg oral tablet ; Simple Display Line:   3 mg, 1 tab(s), Oral, 0 Refill(s) ; Catalog Code:   melatonin ; Order Dt/Tm:   5/10/2019 9:07:32 AM          multivitamin  :   multivitamin ; Status:   Documented ; Ordered As Mnemonic:   Multiple Vitamins oral tablet ; Simple Display Line:   1 tab(s), po, daily ; Catalog Code:   multivitamin ; Order Dt/Tm:   4/29/2015 8:53:31 AM          omega-3 polyunsaturated fatty acids  :   omega-3 polyunsaturated fatty acids ; Status:   Documented ; Ordered As Mnemonic:   Fish Oil ; Simple Display Line:   1 tab(s), po, bid, tab(s) ; Catalog Code:   omega-3  polyunsaturated fatty acids ; Order Dt/Tm:   2/3/2010 1:36:04 PM          triamcinolone nasal  :   triamcinolone nasal ; Status:   Documented ; Ordered As Mnemonic:   Nasacort ; Simple Display Line:   daily ; Catalog Code:   triamcinolone nasal ; Order Dt/Tm:   5/8/2014 1:00:16 PM          ubiquinone  :   ubiquinone ; Status:   Documented ; Ordered As Mnemonic:   Coenzyme Q10 ; Simple Display Line:   200 mg, po, daily, cap(s) ; Catalog Code:   ubiquinone ; Order Dt/Tm:   4/21/2011 9:04:54 AM

## 2022-02-15 NOTE — TELEPHONE ENCOUNTER
Entered by Leon Pike CMA on May 08, 2020 10:00:46 AM CDT  ---------------------  From: Leon Pike CMA   To: [facility]    Sent: 5/8/2020 10:00:45 AM CDT  Subject: Medication Management     ** Submitted: **  Order:atorvastatin (atorvastatin 80 mg oral tablet)  1 tab(s)  Oral  daily  Qty:  90 tab(s)        Days Supply:  0        Refills:  0          Substitutions Allowed     Route To Pharmacy - [facility]    Signed by Leon Pike CMA  5/8/2020 3:00:00 PM    ** Submitted: **  Complete:atorvastatin (atorvastatin 80 mg oral tablet)   Signed by Leon Pike CMA  5/8/2020 3:00:00 PM    ** Not Approved:  **  atorvastatin (ATORVASTATIN TABS 80MG)  TAKE 1 TABLET DAILY  Qty:  90 tab(s)        Days Supply:  0        Refills:  3          Substitutions Allowed     Route To Pharmacy - [facility]   Signed by Leon Pike CMA            ------------------------------------------  From: [facility]  To: David Sandra MD  Sent: May 7, 2020 11:41:47 PM CDT  Subject: Medication Management  Due: May 8, 2020 11:41:47 PM CDT    ** On Hold Pending Signature **  Drug: atorvastatin (atorvastatin 80 mg oral tablet)  TAKE 1 TABLET DAILY  Quantity: 90 tab(s)  Days Supply: 0  Refills: 3  Substitutions Allowed  Notes from Pharmacy:     Dispensed Drug: atorvastatin (atorvastatin 80 mg oral tablet)  TAKE 1 TABLET DAILY  Quantity: 90 tab(s)  Days Supply: 0  Refills: 3  Substitutions Allowed  Notes from Pharmacy:   ------------------------------------------Med Refill      Date of last office visit and reason:  2/15/20 infection      Date of last Med Check / Px:   5/2/19  Date of last labs pertaining to med:  5/2/19    Note:  Rx filled per protocol.  Leon Pike CMA    RTC order in chart:  yes    For Protocol refill, has patient been contacted:  _

## 2022-02-15 NOTE — TELEPHONE ENCOUNTER
Entered by Anjelica Hendricks RN on May 07, 2019 9:17:29 AM CDT  ---------------------  From: Anjelica Hendricks RN   To: [facility]    Sent: 5/7/2019 9:17:29 AM CDT  Subject: Medication Management     ** Submitted: **  Order:atorvastatin (atorvastatin 80 mg oral tablet)  1 tab(s)  Oral  daily  Qty:  30 tab(s)        Refills:  0          Substitutions Allowed     Route To Pharmacy - [facility]    Signed by Anjelica Hendricks RN  5/7/2019 9:17:00 AM    ** Submitted: **  Complete:atorvastatin (atorvastatin 80 mg oral tablet)   Signed by Anjelica Hendricks RN  5/7/2019 9:17:00 AM    ** Submitted: **  Complete:atorvastatin (Lipitor 80 mg oral tablet)   Signed by Anjelica Hendricks RN  5/7/2019 9:17:00 AM    ** Not Approved:  **  atorvastatin (ATORVASTATIN TABS 80MG)  TAKE 1 TABLET DAILY  Qty:  90 tab(s)        Days Supply:  0        Refills:  3          Substitutions Allowed     Route To Pharmacy - [facility]   Signed by Anjelica Hendricks RN            ------------------------------------------  From: [facility]  To: David Sandra MD  Sent: May 6, 2019 12:01:10 AM CDT  Subject: Medication Management  Due: May 7, 2019 12:01:10 AM CDT    ** On Hold Pending Signature **  Drug: atorvastatin (Lipitor 80 mg oral tablet)  1 TAB(S) PO DAILY  Quantity: 90 tab(s)     Days Supply: 0         Refills: 3  Substitutions Allowed  Notes from Pharmacy:     Dispensed Drug: atorvastatin (atorvastatin 80 mg oral tablet)  TAKE 1 TABLET DAILY  Quantity: 90 tab(s)     Days Supply: 0         Refills: 3  Substitutions Allowed  Notes from Pharmacy:   ------------------------------------------Date of last office visit and reason:  6-14-18 Kidney Stone w/[facility]      Date of last Med Check / Px:   5-8-18  Date of last labs pertaining to med:  5-2-18    RTC order in chart:  yes, due now. Patient has appointment scheduled w/[facility]. 30 day supply sent.    For Protocol refill, has patient been contacted:  _

## 2022-02-15 NOTE — TELEPHONE ENCOUNTER
---------------------  From: Diana Del Valle LPN (Phone Messages Pool ([address] [place] [address]))   To: [facility] Message Pool ([address] [place] [address]);     Sent: 3/20/2020 8:18:21 AM CDT  Subject: CONSUMER MESSAGE FW: RX Refill Request     Please advise- requires provider approval.        ---------------------  From: MAICO SILVESTRE  To: [facility]  Sent: 03/19/2020 08:23 p.m. CDT  Subject: RX Refill Request  I need a refill of my Tamsulosin Hcl Caps 0.4Mg prescription.  Can the refill be approved and sent to [facility]?    Thank you,    Donald---------------------  From: Briseida Pope ([facility] Message Pool ([address] [place] [address]))   To: David Sandra MD;     Sent: 3/20/2020 8:27:19 AM CDT  Subject: FW: CONSUMER MESSAGE FW: RX Refill Request---------------------  From: David Sandra MD   To: [facility] Message Pool ([address] [place] [address]);     Sent: 3/20/2020 8:29:53 AM CDT  Subject: RE: CONSUMER MESSAGE FW: RX Refill Request     ok to refill same # and directions 1 year---------------------  From: Briseida Pope ([facility] Message Pool ([address] [place] [address]))   To: MACIO SILVESTRE    Sent: 3/20/2020 8:32:52 AM CDT  Subject: FW: CONSUMER MESSAGE FW: RX Refill Request     Maico-  This prescription was sent to [facility].  Thank you,   Enriqueta

## 2022-02-15 NOTE — TELEPHONE ENCOUNTER
---------------------  From: Briseida Pope   To: Appointment Pool ([id number] - [place]);     Sent: 5/15/2020 3:49:26 PM CDT  Subject: General Message     Please call patient to schedule face to face visit with TFS.  (labs to be done same day, no need for separate lab visit)     Due for: A1c and Lipidpatient scheduled 5/22 @ 9:40am

## 2022-02-15 NOTE — LETTER
(Inserted Image. Unable to display)     November 27, 2020      YASMIN SILVESTRE  [address] [place], [place] [id number]          Dear YASMIN,      Thank you for selecting [facility] ([facility]) for your healthcare needs.    Our records indicate you are due for the following services:     Follow-up office visit / Medication Check.    (FYI   Regarding office visits: In some instances, a video visit or telephone visit may be offered as an option.)      To schedule an appointment or if you have further questions, please contact your primary clinic:   [facility]       [phone]   [facility]       [phone]              [facility]     [phone]      Powered by [employer]    Sincerely,    David Sandra MD

## 2022-02-15 NOTE — TELEPHONE ENCOUNTER
Entered by Marleni Santacruz CMA on July 13, 2021 12:39:08 PM CDT  ---------------------  From: Marleni Santacruz CMA   To: [facility]    Sent: 7/13/2021 12:39:04 PM CDT  Subject: Medication Management     ** Not Approved: Patient no longer under Prescriber care **  atorvastatin (ATORVASTATIN TABS 80MG)  TAKE 1 TABLET DAILY  Qty:  90 tab(s)        Days Supply:  0        Refills:  3          Substitutions Allowed     Route To Pharmacy - [facility]   Signed by Marleni Santacruz CMA            ** Patient matched by Marleni Santacruz CMA on 7/13/2021 12:36:04 PM CDT **      ------------------------------------------  From: [facility]  To: David Sandra MD  Sent: July 11, 2021 11:52:16 PM CDT  Subject: Medication Management  Due: June 4, 2021 8:26:15 PM CDT     ** On Hold Pending Signature **     Drug: atorvastatin (atorvastatin 80 mg oral tablet), TAKE 1 TABLET DAILY  Quantity: 90 tab(s)  Days Supply: 0  Refills: 3  Substitutions Allowed  Notes from Pharmacy:     Dispensed Drug: atorvastatin (atorvastatin 80 mg oral tablet), TAKE 1 TABLET DAILY  Quantity: 90 tab(s)  Days Supply: 0  Refills: 3  Substitutions Allowed  Notes from Pharmacy:  ------------------------------------------

## 2022-02-15 NOTE — LETTER
(Inserted Image. Unable to display)   May 17, 2021    YASMIN NIRMALA  [place] [address]            Dear YASMIN,      Thank you for selecting [facility] for your healthcare needs.    Our records indicate you are due for the following services:    Medicare Annual Wellness Visit.    Fasting Lab Tests ~ Please do not eat or drink anything 10 hours prior to your scheduled appointment time.  (Water and any medications that you may need are allowed unless directed otherwise.)    If you had your labs done at another facility or with [facility] Lab Testing at [facility], please bring in a copy of the results to your next visit, mail a copy, or drop off a copy of your results to your Healthcare Provider.    (FYI   Regarding office visits: In some instances, a video visit or telephone visit may be offered as an option.)    You are due for lab work and an office visit; please schedule the lab appointment 1 week before the office visit.  This will assure all results are available to discuss with your Healthcare Provider during your visit.    **It is very helpful if you bring your medication bottles to your appointment.  This assures we have all of your current medications, including strength and dosing information, documented accurately in your medical record.    To schedule an appointment or if you have further questions, please contact your clinic at [phone].      Powered by [employer]    Sincerely,    David Sandra MD

## 2022-02-15 NOTE — PROGRESS NOTES
Patient:   YASMIN SILVESTRE            MRN: [id number]            FIN: [id number]               Age:   53 years     Sex:  Male     :  1965   Associated Diagnoses:   Annual exam; HLD (Hyperlipidemia); ASHD (Arteriosclerotic Heart Disease); Prediabetes; Achilles tendonitis, bilateral; BPH (benign prostatic hyperplasia)   Author:   David Sandra MD      Visit Information      Date of Service: 05/10/2019 09:00 am  Performing Location: [facility]  Encounter#: [id number]      Primary Care Provider (PCP):  David Sandra MD# [id number]      Referring Provider:  David Sandra MD# [id number]      Chief Complaint   5/10/2019 9:04 AM CDT    Yearly px     Routine Health Care Visit      History of Present Illness   Doing well no concerns             The patient presents for a general exam.  The patient's general health status is described as good.  The patient's diet is described as balanced.  Exercise: occasional.  Associated symptoms consist of none.  Medical encounters: none.  Compliance problems: none.  Complaint: Occasional back pain for years   no change of family history over last year.  Additional pertinent history: occasional caffeine use, tobacco use none and alcohol use socially.     meds as directed  Still achiles issues seeing ortho  Notes bph sxs last few years with more frequency and less stream Has seen urology         Review of Systems   Constitutional:  Negative.    Eye:  Negative.    Ear/Nose/Mouth/Throat:  Negative.    Respiratory:  Negative.    Cardiovascular:  Negative.    Gastrointestinal:  Negative.    Genitourinary:  Negative except as documented in history of present illness.    Hematology/Lymphatics:  Negative.    Endocrine:  Negative.    Immunologic:  Negative.    Musculoskeletal:  Negative except as documented in history of present illness.    Integumentary:  Negative.    Neurologic:  Negative.    Psychiatric:  Negative.    All other systems reviewed and  negative      Health Status   Allergies:    Allergic Reactions (Selected)  No known allergies   Medications:  (Selected)   Prescriptions  Prescribed  alfuzosin 10 mg oral tablet, extended release: = 1 tab(s), Oral, daily, Instructions: due 5/8/19 for annual physical, # 90 tab(s), 0 Refill(s), Type: Maintenance, Pharmacy: [facility]  aspirin 81 mg oral tablet: 1 tab(s) ( 81 mg ), PO, Daily, # 30 tab(s), 0 Refill(s), Type: Soft Stop, OTC (Rx)  atorvastatin 80 mg oral tablet: = 1 tab(s), Oral, daily, # 30 tab(s), 0 Refill(s), Type: Maintenance, Pharmacy: [facility]  Documented Medications  Documented  Coenzyme Q10: ( 200 mg ), po, daily, cap(s), 0 Refill(s), Type: Maintenance  Fish Oil: 1 tab(s), po, bid, tab(s), 0 Refill(s), Type: Maintenance  Melatonin 3 mg oral tablet: = 1 tab(s) ( 3 mg ), Oral, 0 Refill(s), Type: Maintenance  Multiple Vitamins oral tablet: 1 tab(s), po, daily, 0 Refill(s), Type: Maintenance  Nasacort: daily, 0 Refill(s), Type: Maintenance  glucosamine: PO, Daily, 0,    Medications          *denotes recorded medication          alfuzosin 10 mg oral tablet, extended release: 1 tab(s), Oral, daily, due 5/8/19 for annual physical, 90 tab(s), 0 Refill(s).          aspirin 81 mg oral tablet: 81 mg, 1 tab(s), PO, Daily, 30 tab(s).          atorvastatin 80 mg oral tablet: 1 tab(s), Oral, daily, 30 tab(s), 0 Refill(s).          *glucosamine: PO, Daily.          *Melatonin 3 mg oral tablet: 3 mg, 1 tab(s), Oral, 0 Refill(s).          *Multiple Vitamins oral tablet: 1 tab(s), po, daily.          *Fish Oil: 1 tab(s), po, bid, tab(s).          *Nasacort: daily.          *Coenzyme Q10: 200 mg, po, daily, cap(s).     Problem list:    All Problems  ASHD (arteriosclerotic heart disease) / SNOMED CT 62219136 / Confirmed  HLD (hyperlipidemia) / SNOMED CT 997447247 / Confirmed  Prediabetes / SNOMED CT 58807996 / Confirmed  Joint pain / SNOMED CT 91087559 / Confirmed  Kidney stone  / SNOMED CT 174890616 / Confirmed  Tennis elbow / SNOMED CT 495145489 / Confirmed  Seasonal allergies / SNOMED CT 507647184 / Confirmed  Urinary incontinence / SNOMED CT 5463691997 / Confirmed  Resolved: History of chicken pox / SNOMED CT 476295400  Canceled: Hx of abnormal cardiac calcium score      Histories   Past Medical History:    Active  Tennis elbow (815297601)  ASHD (arteriosclerotic heart disease) (52136323)  HLD (hyperlipidemia) (652369979)  Seasonal allergies (741889196)  Resolved  History of chicken pox (604845141):  Resolved.   Family History:    Diabetes mellitus  Father (Randy)  Hypertension  Mother (Erika)  Heart disease  Mother (Erika)  Cancer of colon  Grandfather (P)  Comments:  4/19/2011 7:27 PM CDT - Elba Lindsay  80s  Crohn's disease  Sister (Anjelica)  MI - Myocardial infarction  Uncle (P)  Comments:  4/19/2011 7:27 PM CDT - Elba Lidnsay  50s  Father (Randy)  Comments:  4/19/2011 7:29 PM CDT - Elba Lindsay  early 50s  ASHD - Atherosclerotic heart disease  Father (Randy)     Procedure history:    Colonoscopy (SNOMED CT 035496453) performed by Nacho Villalobos MD on 7/25/2017 at 51 Years.  Comments:  8/1/2017 10:38 AM CDT - Silvana Vega RN  Indication: screening  Sedation: fentanyl and versed  Findings: normal colonoscopy  F/U: 10 years  Cardiac computed tomography for calcium scoring (SNOMED CT 0236349601) on 5/17/2017 at 51 Years.  Comments:  5/19/2017 2:23 PM CDT - Marly Harden  Total coronary calcium score is 182  Excision of sebaceous cyst (SNOMED CT 779646739) performed by Kyle Pike MD on 6/16/2016 at 50 Years.  Comments:  6/21/2016 1:14 PM CDT - Maisha Mchugh  Chest.  CT Cardiac Calcium Score on 11/17/2008 at 43 Years.  Comments:  5/7/2013 9:10 AM CDT - Briseida Romero  Date of test: 11/17/08.  Score: 159   Social History:        Alcohol Assessment            Current, Beer (12 oz), 1 time per week, 1 drinks/episode average.  2 drinks/episode maximum.      Tobacco  Assessment            Never      Substance Abuse Assessment            Never      Employment and Education Assessment            Employed, Work/School description: Insurance Claim Supervisor.      Home and Environment Assessment            Marital status: .  Spouse/Partner name: Corina pandya.  Risks in environment: owns secured gun.      Nutrition and Health Assessment            Type of diet: Regular.      Exercise and Physical Activity Assessment            Exercise frequency: Daily.  Exercise type: Walking.      Sexual Assessment            Sexually active: Yes.      Physical Examination   Vital Signs   5/10/2019 9:04 AM CDT Temperature Tympanic 97.2 DegF  LOW    Peripheral Pulse Rate 58 bpm  LOW    Pulse Site Radial artery    HR Method Manual    Systolic Blood Pressure 108 mmHg    Diastolic Blood Pressure 60 mmHg    Mean Arterial Pressure 76 mmHg    BP Site Right arm    BP Method Manual      Measurements from flowsheet : Measurements   5/10/2019 9:04 AM CDT Height Measured - Standard 65.5 in    Weight Measured - Standard 174.4 lb    BSA 1.91 m2    Body Mass Index 28.58 kg/m2  HI      General:  Alert and oriented.    Eye:  Pupils are equal, round and reactive to light, Normal conjunctiva.    HENT:  Normocephalic, Tympanic membranes are clear, Oral mucosa is moist.    Neck:  Supple, Non-tender, No lymphadenopathy, No thyromegaly.    Respiratory:  Breath sounds are equal, Symmetrical chest wall expansion.         Respirations: Are within normal limits.         Pattern: Regular.         Breath sounds: Bilateral, Within normal limits.    Cardiovascular:  Normal rate, Regular rhythm, No murmur, Good pulses equal in all extremities, Normal peripheral perfusion, No edema.    Breast:  No mass.         Lymph nodes: Within normal limits.    Gastrointestinal:  Soft, Non-tender, Non-distended, Normal bowel sounds.    Musculoskeletal:  Normal range of motion, Normal strength, No swelling.    Integumentary:  Warm, Dry.     Neurologic:  No focal deficits.    Cognition and Speech:  Oriented, Speech clear and coherent.    Psychiatric:  Appropriate mood & affect, Normal judgment.       Health Maintenance      Recommendations     Pending (in the next year)        OverDue           Aspirin Therapy for Prevention of CVD (Male) due  05/31/17  and every 5  year(s)           Alcohol Misuse Screen (Male) due  05/04/18  and every 1  year(s)        Due In Future            Influenza Vaccine not due until  09/01/19  and every 1  year(s)           Lipid Disorders Screen (Male) not due until  05/02/20  and every 1  year(s)           Type 2 Diabetes Mellitus Screen (Male) not due until  05/02/20  and every 1  year(s)     Satisfied (in the past 1 year)        Satisfied            Body Mass Index Check (Male) on  05/10/19.           Depression Screen (Male) on  05/10/19.           High Blood Pressure Screen (Male) on  05/10/19.           High Blood Pressure Screen (Male) on  05/02/19.           High Blood Pressure Screen (Male) on  01/28/19.           High Blood Pressure Screen (Male) on  06/14/18.           High Blood Pressure Screen (Male) on  06/12/18.           Influenza Vaccine on  10/18/18.           Lipid Disorders Screen (Male) on  05/02/19.           Lipid Disorders Screen (Male) on  05/02/19.           Lipid Disorders Screen (Male) on  05/02/19.           Lipid Disorders Screen (Male) on  05/02/19.           Tobacco Use Screen (Male) on  05/10/19.           Tobacco Use Screen (Male) on  06/12/18.           Type 2 Diabetes Mellitus Screen (Male) on  05/02/19.        Review / Management   Results review:  Lab results   5/2/2019 8:17 AM CDT Glucose Level 97 mg/dL    Hgb A1c 5.9  HI    Cholesterol 117 mg/dL    Non-HDL 51    HDL 66 mg/dL    Chol/HDL Ratio 1.8    LDL 38    Triglyceride 45 mg/dL   .       Impression and Plan   Diagnosis     Annual exam (VKQ05-QF Z00.00).     HLD (Hyperlipidemia) (LBL03-FO E78.5).     ASHD (Arteriosclerotic Heart  Disease) (FNW83-GN I25.10).     Prediabetes (SLZ73-TW R73.09).     Achilles tendonitis, bilateral (TLZ10-ST M76.61).     BPH (benign prostatic hyperplasia) (LBB93-MY N40.0).     Course:  Not progressing as expected.    Plan:  fu 1 yr, dc lipitor for now for achiles  BPH rx reviewed  labs.    Patient Instructions:       Counseled: Patient, Regarding diagnosis, Regarding medications, Diet, Activity, Verbalized understanding.    Counseled:  Patient, Routine exercise and healthy weight maintenance discussed.    Patient Instructions:  Return to clinic in one year for next routine health visit, or sooner if problems or concerns.

## 2022-02-15 NOTE — PROGRESS NOTES
Patient:   YASMIN SILVESTRE            MRN: [id number]            FIN: [id number]               Age:   52 years     Sex:  Male     :  1965   Associated Diagnoses:   Kidney stone   Author:   David Sandra MD      Visit Information      Date of Service: 2018 11:13 am  Performing Location: [facility]  Encounter#: [id number]      Primary Care Provider (PCP):  David Sandra MD    NPI# [id number]      Referring Provider:  David Sandra MD# [id number]      Chief Complaint   2018 11:15 AM CDT   Kidney stone, increased pain.      History of Present Illness   see chief complaint as noted above and confirmed with the patient   In er monday  2mm prox right stone  no pain yesterday  pain worse today right testicle to r cva   No vomiting fever dysuria  on oxy no help         Review of Systems   Constitutional:  No fever, No chills.    Ear/Nose/Mouth/Throat:  Negative.    Respiratory:  No shortness of breath.    Cardiovascular:  No chest pain.    Gastrointestinal:  Nausea.         Abdominal pain: Right, Lower quadrant.    Genitourinary:  Kidney stone.    Musculoskeletal:       Back pain: On the right side, In the lower region.    Integumentary:  No rash.              Health Status   Allergies:    Allergic Reactions (Selected)  No known allergies   Medications:  (Selected)   Prescriptions  Prescribed  Lipitor 80 mg oral tablet: 1 tab(s) ( 80 mg ), PO, Daily, # 90 tab(s), 3 Refill(s), Type: Maintenance, Pharmacy: [facility], 1 tab(s) po daily  Uroxatral 10 mg oral tablet, extended release: 1 tab(s) ( 10 mg ), po, daily, # 90 tab(s), 3 Refill(s), Type: Maintenance, Pharmacy: [facility], 1 tab(s) po daily  Zofran ODT 4 mg oral tablet, disintegratin tab(s) ( 4 mg ), po, prn, # 12 tab(s), 0 Refill(s), Type: Maintenance, [id number], 1 tab(s) po prn  aspirin 81 mg oral tablet: 1 tab(s) ( 81 mg ), PO, Daily, # 30 tab(s),  0 Refill(s), Type: Soft Stop, OTC (Rx)  oxyCODONE 5 mg oral capsule: 1 cap(s) ( 5 mg ), po, q6 hrs, Instructions: Rx'd by ED on 18, PRN: as needed for pain, # 20 cap(s), 0 Refill(s), Type: Maintenance  Documented Medications  Documented  Coenzyme Q10: ( 200 mg ), po, daily, cap(s), 0 Refill(s), Type: Maintenance  Fish Oil: 1 tab(s), po, bid, tab(s), 0 Refill(s), Type: Maintenance  Multiple Vitamins oral tablet: 1 tab(s), po, daily, 0 Refill(s), Type: Maintenance  Nasacort: daily, 0 Refill(s), Type: Maintenance  glucosamine: PO, Daily, 0,    Medications          *denotes recorded medication          Uroxatral 10 mg oral tablet, extended release: 10 mg, 1 tab(s), po, daily, 90 tab(s), 3 Refill(s).          aspirin 81 mg oral tablet: 81 mg, 1 tab(s), PO, Daily, 30 tab(s).          Lipitor 80 mg oral tablet: 80 mg, 1 tab(s), PO, Daily, 90 tab(s), 3 Refill(s).          *glucosamine: PO, Daily.          *Multiple Vitamins oral tablet: 1 tab(s), po, daily.          *Fish Oil: 1 tab(s), po, bid, tab(s).          Zofran ODT 4 mg oral tablet, disintegratin mg, 1 tab(s), po, prn, 12 tab(s), 0 Refill(s).          oxyCODONE 5 mg oral capsule: 5 mg, 1 cap(s), po, q6 hrs, Rx'd by ED on 18, PRN: as needed for pain, 20 cap(s), 0 Refill(s).          *Nasacort: daily.          *Coenzyme Q10: 200 mg, po, daily, cap(s).     Problem list:    All Problems (Selected)  ASHD (arteriosclerotic heart disease) / SNOMED CT 84421013 / Confirmed  HLD (hyperlipidemia) / SNOMED CT 635055258 / Confirmed  Prediabetes / SNOMED CT 50375486 / Confirmed  Joint pain / SNOMED CT 44270806 / Confirmed  Tennis elbow / SNOMED CT 228550820 / Confirmed  Seasonal allergies / SNOMED CT 711916728 / Confirmed  Urinary incontinence / SNOMED CT 0280113204 / Confirmed      Histories   Past Medical History:    Active  Tennis elbow (458942078)  ASHD (arteriosclerotic heart disease) (76675370)  HLD (hyperlipidemia) (512700943)  Seasonal allergies  (303044801)  Resolved  History of chicken pox (884429123):  Resolved.   Family History:    Diabetes mellitus  Father (Randy)  Hypertension  Mother (Erika)  Heart disease  Mother (Erika)  Cancer of colon  Grandfather (P)  Comments:  4/19/2011 7:27 PM - Elba Lindsay  80s  Crohn's disease  Sister (Anjelica)  MI - Myocardial infarction  Uncle (P)  Comments:  4/19/2011 7:27 PM - Elba Lindsay  50s  Father (Randy)  Comments:  4/19/2011 7:29 PM - Elba Lindsay  early 50s  ASHD - Atherosclerotic heart disease  Father (Randy)     Procedure history:    Colonoscopy (SNOMED CT 995501636) performed by Nacho Villalobos MD on 7/25/2017 at 51 Years.  Comments:  8/1/2017 10:38 AM - Silvana Vega RN  Indication: screening  Sedation: fentanyl and versed  Findings: normal colonoscopy  F/U: 10 years  Cardiac computed tomography for calcium scoring (SNOMED CT 2486163137) on 5/17/2017 at 51 Years.  Comments:  5/19/2017 2:23 PM - Marly Harden  Total coronary calcium score is 182  Excision of sebaceous cyst (SNOMED CT 108514012) performed by Kyle Pike MD on 6/16/2016 at 50 Years.  Comments:  6/21/2016 1:14 PM - Maisha Mchugh  Chest.  CT Cardiac Calcium Score on 11/17/2008 at 43 Years.  Comments:  5/7/2013 9:10 AM - Briseida Romero  Date of test: 11/17/08.  Score: 159   Social History:        Alcohol Assessment            Current, Beer (12 oz), 1 time per week, 1 drinks/episode average.  2 drinks/episode maximum.      Tobacco Assessment            Never      Substance Abuse Assessment            Never      Employment and Education Assessment            Employed, Work/School description: Insurance Claim Supervisor.      Home and Environment Assessment            Marital status: .  Spouse/Partner name: Corina pandya.  Risks in environment: owns secured gun.      Nutrition and Health Assessment            Type of diet: Regular.      Exercise and Physical Activity Assessment            Exercise frequency: Daily.  Exercise type:  Walking.      Sexual Assessment            Sexually active: Yes.        Physical Examination   Vital Signs   6/14/2018 11:15 AM CDT Temperature Tympanic 98.2 DegF    Peripheral Pulse Rate 54 bpm  LOW    Systolic Blood Pressure 162 mmHg  HI    Diastolic Blood Pressure 73 mmHg    Mean Arterial Pressure 103 mmHg      General:  Alert and oriented, No acute distress.    Eye:  Pupils are equal, round and reactive to light, Normal conjunctiva.    HENT:  Oral mucosa is moist.    Neck:  Supple.    Respiratory:  Respirations are non-labored.    Cardiovascular:  Normal rate, Regular rhythm, No edema.    Gastrointestinal:  Non-distended, r cva tender.    Integumentary:  Warm, No rash.    Psychiatric:  Cooperative, Appropriate mood & affect, Normal judgment.       Review / Management   Results review      Impression and Plan   Diagnosis     Kidney stone (SFG37-RX N20.0).     Plan:  1 liter NS here with toradol Pain releved  renew oxycod  urology tomorrow if severe pain     see me in 2 weeks.

## 2022-02-15 NOTE — PROGRESS NOTES
Patient:   YASMIN SILVESTRE            MRN: [id number]            FIN: [id number]               Age:   52 years     Sex:  Male     :  1965   Associated Diagnoses:   Annual exam; HLD (Hyperlipidemia); ASHD (Arteriosclerotic Heart Disease); Prediabetes; Achilles tendonitis, bilateral; BPH (benign prostatic hyperplasia)   Author:   David Sandra MD      Visit Information      Date of Service: 2018 05:00 pm  Performing Location: [facility]  Encounter#: [id number]      Primary Care Provider (PCP):  David Sandra MD# [id number]      Referring Provider:  David Sandra MD# [id number]      Chief Complaint   2018 5:12 PM CDT     Patient here for Annual Physical.     Routine Health Care Visit      History of Present Illness   Doing well no concerns             The patient presents for a general exam.  The patient's general health status is described as good.  The patient's diet is described as balanced.  Exercise: occasional.  Associated symptoms consist of none.  Medical encounters: none.  Compliance problems: none.  Complaint: Occasional back pain for years   no change of family history over last year.  Additional pertinent history: occasional caffeine use, tobacco use none and alcohol use socially.     meds as directed  some elbow issues  Notes bph sxs last few years with more frequency and less stream  Chronic achiles tendonitis      Review of Systems   Constitutional:  Negative.    Eye:  Negative.    Ear/Nose/Mouth/Throat:  Negative.    Respiratory:  Negative.    Cardiovascular:  Negative.    Gastrointestinal:  Negative.    Genitourinary:  Negative except as documented in history of present illness.    Hematology/Lymphatics:  Negative.    Endocrine:  Negative.    Immunologic:  Negative.    Musculoskeletal:  Negative except as documented in history of present illness.    Integumentary:  Negative.    Neurologic:  Negative.    Psychiatric:  Negative.    All other  systems reviewed and negative      Health Status   Allergies:    Allergic Reactions (Selected)  No known allergies   Medications:  (Selected)   Prescriptions  Prescribed  Lipitor 80 mg oral tablet: 1 tab(s) ( 80 mg ), PO, Daily, # 90 tab(s), 3 Refill(s), Type: Maintenance, Pharmacy: [facility], 1 tab(s) po daily  Uroxatral 10 mg oral tablet, extended release: 1 tab(s) ( 10 mg ), po, daily, # 90 tab(s), 3 Refill(s), Type: Maintenance, Pharmacy: [facility], 1 tab(s) po daily  aspirin 81 mg oral tablet: 1 tab(s) ( 81 mg ), PO, Daily, # 30 tab(s), 0 Refill(s), Type: Soft Stop, OTC (Rx)  Documented Medications  Documented  Coenzyme Q10: ( 200 mg ), po, daily, cap(s), 0 Refill(s), Type: Maintenance  Fish Oil: 1 tab(s), po, bid, tab(s), 0 Refill(s), Type: Maintenance  Multiple Vitamins oral tablet: 1 tab(s), po, daily, 0 Refill(s), Type: Maintenance  Nasacort: daily, 0 Refill(s), Type: Maintenance  Probiotic Formula: 1 cap(s), po, daily, 0 Refill(s), Type: Maintenance  glucosamine: PO, Daily, 0,    Medications          *denotes recorded medication          Uroxatral 10 mg oral tablet, extended release: 10 mg, 1 tab(s), po, daily, 90 tab(s), 3 Refill(s).          aspirin 81 mg oral tablet: 81 mg, 1 tab(s), PO, Daily, 30 tab(s).          Lipitor 80 mg oral tablet: 80 mg, 1 tab(s), PO, Daily, 90 tab(s), 3 Refill(s).          *Probiotic Formula: 1 cap(s), po, daily, 0 Refill(s).          *glucosamine: PO, Daily.          *Multiple Vitamins oral tablet: 1 tab(s), po, daily.          *Fish Oil: 1 tab(s), po, bid, tab(s).          *Nasacort: daily.          *Coenzyme Q10: 200 mg, po, daily, cap(s).     Problem list:    All Problems (Selected)  ASHD (arteriosclerotic heart disease) / SNOMED CT 11151949 / Confirmed  HLD (hyperlipidemia) / SNOMED CT 109893962 / Confirmed  Prediabetes / SNOMED CT 47903327 / Confirmed  Tennis elbow / SNOMED CT 910543901 / Confirmed  Seasonal allergies / SNOMED CT  367101372 / Confirmed      Histories   Past Medical History:    Active  Tennis elbow (279615347)  ASHD (arteriosclerotic heart disease) (40896814)  HLD (hyperlipidemia) (643820790)  Seasonal allergies (049428055)  Resolved  History of chicken pox (222164415):  Resolved.   Family History:    Diabetes mellitus  Father (Randy)  Hypertension  Mother (Erika)  Heart disease  Mother (Erika)  Prediabetes  Daughter (Dai)  Cancer of colon  Grandfather (P)  Comments:  4/19/2011 7:27 PM - Neftali Elba  80s  Crohn's disease  Sister (Anjelica)  MI - Myocardial infarction  Uncle (P)  Comments:  4/19/2011 7:27 PM - Elba Lindsay  50s  Father (Randy)  Comments:  4/19/2011 7:29 PM - Neftali Elba  early 50s  ASHD - Atherosclerotic heart disease  Father (Randy)     Procedure history:    Colonoscopy (SNOMED CT 521586950) performed by Nacho Villalobos MD on 7/25/2017 at 51 Years.  Comments:  8/1/2017 10:38 AM - Silvana Vega RN  Indication: screening  Sedation: fentanyl and versed  Findings: normal colonoscopy  F/U: 10 years  Cardiac computed tomography for calcium scoring (SNOMED CT 1613260396) on 5/17/2017 at 51 Years.  Comments:  5/19/2017 2:23 PM - Marly Harden  Total coronary calcium score is 182  Excision of sebaceous cyst (SNOMED CT 609642272) performed by Kyle Pike MD on 6/16/2016 at 50 Years.  Comments:  6/21/2016 1:14 PM - Maisha Mchugh  Chest.  CT Cardiac Calcium Score on 11/17/2008 at 43 Years.  Comments:  5/7/2013 9:10 AM - Briseida Romero  Date of test: 11/17/08.  Score: 159   Social History:        Alcohol Assessment            Current, Beer (12 oz), 1 time per week, 1 drinks/episode average.  2 drinks/episode maximum.      Tobacco Assessment            Never      Substance Abuse Assessment            Never      Employment and Education Assessment            Employed, Work/School description: Insurance Claim Supervisor.      Home and Environment Assessment            Marital status: .   Spouse/Partner name: Corina pandya.  Risks in environment: owns secured gun.      Nutrition and Health Assessment            Type of diet: Regular.      Exercise and Physical Activity Assessment            Exercise frequency: Daily.  Exercise type: Walking.      Sexual Assessment            Sexually active: Yes.        Physical Examination   Vital Signs   5/8/2018 5:12 PM CDT Temperature Tympanic 97.5 DegF  LOW    Peripheral Pulse Rate 58 bpm  LOW    Pulse Site Brachial artery    HR Method Electronic    Systolic Blood Pressure 127 mmHg    Diastolic Blood Pressure 80 mmHg    Mean Arterial Pressure 96 mmHg    BP Site Right arm    BP Method Electronic      Measurements from flowsheet : Measurements   5/8/2018 5:12 PM CDT Height Measured - Standard 65.5 in    Weight Measured - Standard 173.6 lb    BSA 1.91 m2    Body Mass Index 28.45 kg/m2  HI      General:  Alert and oriented.    Eye:  Pupils are equal, round and reactive to light, Normal conjunctiva.    HENT:  Normocephalic, Tympanic membranes are clear, Oral mucosa is moist.    Neck:  Supple, Non-tender, No lymphadenopathy, No thyromegaly.    Respiratory:  Breath sounds are equal, Symmetrical chest wall expansion.         Respirations: Are within normal limits.         Pattern: Regular.         Breath sounds: Bilateral, Within normal limits.    Cardiovascular:  Normal rate, Regular rhythm, No murmur, Good pulses equal in all extremities, Normal peripheral perfusion, No edema.    Breast:  No mass.         Lymph nodes: Within normal limits.    Gastrointestinal:  Soft, Non-tender, Non-distended, Normal bowel sounds.    Musculoskeletal:  Normal range of motion, Normal strength, No swelling, \tender left med epicondyle.    Integumentary:  Warm, Dry.    Neurologic:  No focal deficits.    Cognition and Speech:  Oriented, Speech clear and coherent.    Psychiatric:  Appropriate mood & affect, Normal judgment.       Health Maintenance      Recommendations     Pending (in the next  year)        OverDue           Aspirin Therapy for Prevention of CVD (Male) due  05/31/17  and every 5  year(s)        Due            Alcohol Misuse Screen (Male) due  05/04/18  and every 1  year(s)           Depression Screen (Male) due  05/04/18  and every 1  year(s)        Due In Future            Influenza Vaccine not due until  10/16/18  and every 1  year(s)           Lipid Disorders Screen (Male) not due until  04/23/19  and every 1  year(s)     Satisfied (in the past 1 year)        Satisfied            Body Mass Index Check (Male) on  05/08/18.           Body Mass Index Check (Male) on  06/06/17.           Colorectal Cancer Screen (Colonoscopy) (Male) on  07/25/17.           Colorectal Cancer Screen (Colonoscopy) (Male) on  07/25/17.           Colorectal Cancer Screen (Colonoscopy) (Male) on  07/25/17.           Colorectal Cancer Screen (Occult Blood) (Male) on  07/25/17.           Colorectal Cancer Screen (Occult Blood) (Male) on  07/25/17.           Colorectal Cancer Screen (Occult Blood) (Male) on  07/25/17.           Colorectal Cancer Screen (Sigmoidoscopy) (Male) on  07/25/17.           Colorectal Cancer Screen (Sigmoidoscopy) (Male) on  07/25/17.           High Blood Pressure Screen (Male) on  05/08/18.           High Blood Pressure Screen (Male) on  06/06/17.           Influenza Vaccine on  10/16/17.           Lipid Disorders Screen (Male) on  04/23/18.           Lipid Disorders Screen (Male) on  04/23/18.           Lipid Disorders Screen (Male) on  04/23/18.           Lipid Disorders Screen (Male) on  04/23/18.           Tobacco Use Screen (Male) on  05/08/18.           Tobacco Use Screen (Male) on  06/06/17.          Review / Management   Results review:  Lab results   4/23/2018 8:13 AM CDT Glucose Level 105 mg/dL  HI    Hgb A1c 5.8  HI    Cholesterol 112 mg/dL    Non-HDL 55    HDL 57 mg/dL    Chol/HDL Ratio 2.0    LDL 42    Triglyceride 47 mg/dL   .       Impression and Plan   Diagnosis     Annual  exam (MRN82-QN Z00.00).     HLD (Hyperlipidemia) (QER23-JN E78.5).     ASHD (Arteriosclerotic Heart Disease) (MYJ05-OB I25.10).     Prediabetes (HTH52-SB R73.09).     Achilles tendonitis, bilateral (LHI98-DQ M76.61).     BPH (benign prostatic hyperplasia) (IJT88-UZ N40.0).     Course:  Not progressing as expected.    Plan:  fu 1 yr, Sports med for achiles  elbow rx reviewed  BPH rx reviewed  labs.    Patient Instructions:       Counseled: Patient, Regarding diagnosis, Regarding medications, Diet, Activity, Verbalized understanding.    Counseled:  Patient, Routine exercise and healthy weight maintenance discussed.    Patient Instructions:  Return to clinic in one year for next routine health visit, or sooner if problems or concerns.

## 2022-02-15 NOTE — TELEPHONE ENCOUNTER
---------------------  From: David Sandra MD   To: YASMIN SILVESTRE    Sent: 5/3/2019 7:38:00 AM CDT    All labs acceptable.  Please let us know you received this message by either selecting Forward or Reply at the top.  Thank you    Results:  Date Result Name Ind Value Ref Range   5/2/2019 8:17 AM Glucose Level  97 mg/dL (65 - 99)   5/2/2019 8:17 AM Hgb A1c ((H)) 5.9 ( - <5.7)   5/2/2019 8:17 AM Cholesterol  117 mg/dL ( - <200)   5/2/2019 8:17 AM HDL  66 mg/dL (>40 - )   5/2/2019 8:17 AM LDL  38    5/2/2019 8:17 AM Triglyceride  45 mg/dL ( - <150)

## 2022-02-15 NOTE — TELEPHONE ENCOUNTER
---------------------  From: Carey Zavaleta LPN (Phone Messages Pool ([id number]))   To: [employer] Message Pool ([id number]_[place] [facility] [place]);     Sent: 6/3/2019 9:59:29 AM CDT  Subject: PHysical Health Screening        Phone Message    PCP:   MENA      Time of Call:  0857       Person Calling:  Patient   Phone number:  [phone] ok LM    Returned call at:     Note:   Patient is calling to see if [facility] had faxed over his Physical Health Screening for his employer.  He states he brought it in when he was in for his annual and as of Friday his employer had not received it.      Last office visit and reason:  05/10/2019 Routine Health Visit Male---------------------  From: Briseida Pope ([employer] Message Pool ([id number]_[facility]))   To: Samanta aZpata;     Sent: 6/4/2019 9:00:31 AM CDT  Subject: FW: PHysical Health Screening      Can someone see if this is in [place].  I typically send myself a message as a reminder to complete it, but maybe it bypassed me.  I just want to make sure before I tell the patient that we don't have it.---------------------  From: Samanta Zapata   To: [employer] Message Pool ([id number]_[place] [facility] [place]);     Sent: 6/4/2019 9:21:12 AM CDT  Subject: RE: PHysical Health Screening      Faxed the 5/10/19 [facility] [employer] form to [employer] @ [phone].  Confirmation received @ 9:09 AM.form printed and refaxed to number on form.  confirmation received.  patient notified.

## 2022-02-15 NOTE — PROGRESS NOTES
Patient:   YASMIN SILVESTRE            MRN: [id number]            FIN: [id number]               Age:   51 years     Sex:  Male     :  1965   Associated Diagnoses:   ASHD (arteriosclerotic heart disease)   Author:   David Sandra MD      Visit Information      Date of Service: 2017 05:20 pm  Performing Location: [facility]  Encounter#: [id number]      Chief Complaint   2017 5:31 PM CDT     f/up cardiac calcium scan.        Interval History   chief complaint and symptoms as noted above confirmed with patient       Review of Systems   Respiratory:  Negative.    Cardiovascular:  Negative.    Musculoskeletal:  Negative.    Neurologic:  Negative.       Health Status   Allergies:    Allergic Reactions (Selected)  No known allergies   Medications:  (Selected)   Prescriptions  Prescribed  Lipitor 80 mg oral tablet: 1 tab(s) ( 80 mg ), PO, Daily, # 90 tab(s), 3 Refill(s), Type: Maintenance, Pharmacy: [facility], 1 tab(s) po daily  aspirin 81 mg oral tablet: 1 tab(s) ( 81 mg ), PO, Daily, # 30 tab(s), 0 Refill(s), Type: Soft Stop, OTC (Rx)  Documented Medications  Documented  Coenzyme Q10: ( 200 mg ), po, daily, cap(s), 0 Refill(s), Type: Maintenance  Fish Oil: 1 tab(s), po, bid, tab(s), 0 Refill(s), Type: Maintenance  Multiple Vitamins oral tablet: 1 tab(s), po, daily, 0 Refill(s), Type: Maintenance  Nasacort: daily, 0 Refill(s), Type: Maintenance  Probiotic Formula: 1 cap(s), po, daily, 0 Refill(s), Type: Maintenance  glucosamine: PO, Daily, 0   Problem list:    All Problems (Selected)  Tennis elbow / SNOMED CT 579617562 / Confirmed  ASHD (arteriosclerotic heart disease) / SNOMED CT 48201249 / Confirmed  HLD (hyperlipidemia) / SNOMED CT 528080436 / Confirmed  Prediabetes / SNOMED CT 30437739 / Confirmed  Seasonal allergies / SNOMED CT 257230511 / Confirmed      Histories   Past Medical History:    Active  Tennis elbow (848289544)  ASHD (arteriosclerotic heart disease)  (58858938)  HLD (hyperlipidemia) (628785478)  Seasonal allergies (896164503)  Resolved  History of chicken pox (589866526):  Resolved.   Family History:    Diabetes mellitus  Father (Randy)  Hypertension  Mother (Erika)  Heart disease  Mother (Erika)  Prediabetes  Daughter (Dai)  Cancer of colon  Grandfather (P)  Comments:  4/19/2011 7:27 PM - Aster Lindsayri  80s  Crohn's disease  Sister (Anjelica)  MI - Myocardial infarction  Uncle (P)  Comments:  4/19/2011 7:27 PM - Neftali Elba  50s  Father (Randy)  Comments:  4/19/2011 7:29 PM - Aster Lindsayri  early 50s  ASHD - Atherosclerotic heart disease  Father (Randy)     Procedure history:    Cardiac computed tomography for calcium scoring (SNOMED CT 9624620958) on 5/17/2017 at 51 Years.  Comments:  5/19/2017 2:23 PM - Marly Harden  Total coronary calcium score is 182  Excision of sebaceous cyst (SNOMED CT 631422143) performed by Kyle Pike MD on 6/16/2016 at 50 Years.  Comments:  6/21/2016 1:14 PM - Maisha Mchugh  Chest.  CT Cardiac Calcium Score on 11/17/2008 at 43 Years.  Comments:  5/7/2013 9:10 AM - Briseida Romero  Date of test: 11/17/08.  Score: 159   Social History:        Alcohol Assessment            Current, Beer (12 oz), 1 time per week, 1 drinks/episode average.  2 drinks/episode maximum.      Tobacco Assessment            Never      Substance Abuse Assessment            Never      Employment and Education Assessment            Employed, Work/School description: Insurance Claim Supervisor.      Home and Environment Assessment            Marital status: .  Spouse/Partner name: Corina pandya.  Risks in environment: owns secured gun.      Nutrition and Health Assessment            Type of diet: Regular.      Exercise and Physical Activity Assessment            Exercise frequency: Daily.  Exercise type: Walking.      Sexual Assessment            Sexually active: Yes.        Physical Examination   Vital Signs   6/6/2017 5:31 PM CDT Temperature  Tympanic 97.6 DegF  LOW    Peripheral Pulse Rate 62 bpm    Systolic Blood Pressure 111 mmHg    Diastolic Blood Pressure 72 mmHg    Mean Arterial Pressure 85 mmHg      Measurements from flowsheet : Measurements   6/6/2017 5:31 PM CDT Height Measured - Standard 65.5 in    Weight Measured - Standard 169.6 lb    BSA 1.88 m2    Body Mass Index 27.79 kg/m2      General:  Alert and oriented, No acute distress.       Review / Management   Results review:  Lab results   5/4/2017 9:37 AM CDT Hgb A1c 6.1  HI   4/24/2017 8:00 AM CDT Glucose Level 98 mg/dL    AST/SGOT 22 unit/L    Cholesterol 141 mg/dL    Non-HDL 77    HDL 64 mg/dL    Chol/HDL Ratio 2.2    LDL 67    Triglyceride 48 mg/dL   .       Impression and Plan   Diagnosis     ASHD (arteriosclerotic heart disease) (KLG86-AJ I25.10).     Course:  Progressing as expected.    Plan:  reviewed ct  score increased but percentile ok  all in lad  increase lipitor to 80  would like to see cards for opinion.    Patient Instructions:       Counseled: Patient, Regarding diagnosis, Regarding treatment, Regarding medications.

## 2022-02-15 NOTE — CARE COORDINATION
Pt appears on  TFS chronic disease panel as out of parameters for elevated BP   Pt has been through 1 round recall.    CC called and LMTCB at 4pm on 01/22/19 can he come in for css only recheck?    Di Agarwal CMA.Spoke with Donald at 1005am.  He has received the letters he just hasn't stopped in.  He will try to stop in soon.  We discussed no caffeine if possible prior to check, take all mediations.  He had no further questions or concerns.    Di Agarwal CMA.

## 2022-02-15 NOTE — TELEPHONE ENCOUNTER
Entered by Marleni Santacruz CMA on March 09, 2020 11:56:15 AM CDT  ---------------------  From: Marleni Santacruz CMA   To: [facility]    Sent: 3/9/2020 11:56:15 AM CDT  Subject: Medication Management     ** Not Approved: Patient has requested refill too soon, #90, 1 sent 10/3/19 **  tamsulosin (TAMSULOSIN HCL CAPS 0.4MG)  TAKE 1 CAPSULE DAILY  Qty:  90 cap(s)        Days Supply:  0        Refills:  3          Substitutions Allowed     Route To Pharmacy - [facility]   Signed by Marleni Santacruz CMA            ------------------------------------------  From: [facility]  To: David Sandra MD  Sent: March 8, 2020 6:16:52 AM CDT  Subject: Medication Management  Due: March 9, 2020 6:16:52 AM CDT    ** On Hold Pending Signature **  Drug: tamsulosin (tamsulosin 0.4 mg oral capsule)  TAKE 1 CAPSULE DAILY  Quantity: 90 cap(s)  Days Supply: 0  Refills: 3  Substitutions Allowed  Notes from Pharmacy:     Dispensed Drug: tamsulosin (tamsulosin 0.4 mg oral capsule)  TAKE 1 CAPSULE DAILY  Quantity: 90 cap(s)  Days Supply: 0  Refills: 3  Substitutions Allowed  Notes from Pharmacy:   ------------------------------------------

## 2022-02-15 NOTE — PROGRESS NOTES
Patient:   YASMIN SILVESTRE            MRN: [id number]            FIN: [id number]               Age:   54 years     Sex:  Male     :  1965   Associated Diagnoses:   None   Author:   Boaz LOPEZ, David      Procedure   EKG procedure   Indication: preop.     Position: supine.     EKG findings   Interpretation: by primary care provider.     Rhythm: sinus bradycardia.     Interpretation: borderline, IRBBB, no ST-T wave abnormalities.     Discussed: with patient.        Impression and Plan   Orders

## 2022-02-15 NOTE — NURSING NOTE
IV Hydration  1,000   CC of  NS  Start:1145  End:_    IV Site  22g cath inserted to right hand without incident. Attempts x 1.    IV meds:  Torodol 30mg  1147-1149End:  1230      Catheter D/C'd intact without incident.

## 2022-02-15 NOTE — NURSING NOTE
Quick Intake Entered On:  5/2/2019 8:28 AM CDT    Performed On:  5/2/2019 8:27 AM CDT by Luzma Velázquez RN               Summary   Chief Complaint :   BP   Systolic Blood Pressure :   104 mmHg   Diastolic Blood Pressure :   66 mmHg   Mean Arterial Pressure :   79 mmHg   BP Site :   Right arm   BP Method :   Manual   Luzma Velázquez RN - 5/2/2019 8:27 AM CDT

## 2022-02-15 NOTE — TELEPHONE ENCOUNTER
Entered by Sarah Beth Rinaldi CMA on May 22, 2020 10:35:01 AM CDT  done      ---------------------  From: David Sandra MD   To: Sarah Beth Rinaldi CMA;     Sent: 5/22/2020 10:15:31 AM CDT  Subject: General Message     ok for to renew meds for 1 year

## 2022-02-15 NOTE — CARE COORDINATION
Pt appears on TFS chronic disease panel as out of parameters for BP.  Pt was seen 6/14/2018 having Kidney stones, placed CSS BP in 1 week. Pt has RTC for AWV 5/2019  Pt does not have HTN DX.

## 2022-02-15 NOTE — TELEPHONE ENCOUNTER
---------------------  From: David Sandra MD   To: YASMIN SILVESTRE    Sent: 12/4/2019 7:33:13 AM CST  Subject: Patient Message - Results Notification        All labs acceptable.  Please let us know you received this message by either selecting Forward or Reply at the top.  Thank you    Results:  Date Result Name Ind Value Ref Range   12/3/2019 5:18 PM Potassium Level  4.2 mmol/L (3.5 - 5.3)   12/3/2019 5:18 PM Glucose Level ((H)) 109 mg/dL (65 - 99)   12/3/2019 5:18 PM Creatinine Level  0.73 mg/dL (0.70 - 1.33)

## 2022-02-15 NOTE — TELEPHONE ENCOUNTER
---------------------  From: Leidy Plasencia CMA (Phone Messages Pool ([address] [place] [address]))   To: [employer] Message Pool ([address] [place] [address]);     Sent: 10/3/2019 10:10:00 AM CDT  Subject: FW: Alfuzosin dosage-increase?           ---------------------  From: MAICO CERDA  To: [facility]  Sent: 10/03/2019 09:55 a.m. CDT  Subject: Alfuzosin dosage-increase?  Can the dosage be increased or is there a different medication that can be prescribed to help me deal with an increase in frequency of needing to urinate and often having to get up in the night?    Thank you,    Donald Cerda---------------------  From: Briseida Pope ([employer] Message Pool ([address] [place] [address]))   To: David Sandra MD;     Sent: 10/3/2019 10:15:24 AM CDT  Subject: FW: Alfuzosin dosage-increase?---------------------  From: David Sandra MD   To: [employer] Message Pool ([address] [place] [address]);     Sent: 10/3/2019 10:26:47 AM CDT  Subject: RE: Alfuzosin dosage-increase?     no other dose   dc alfuzosin  can try flomax .4 mg daily dsp 90 ref 1---------------------  From: Briseida Pope ([employer] Message Pool ([address] [place] [address]))   To: MAICO CERDA    Sent: 10/3/2019 10:51:09 AM CDT  Subject: FW: Alfuzosin dosage-increase?     Maico-  There is no other dose of Alfuzosin.  Dr. Sandra is recommending you try Flomax instead.  A prescription for this was sent to your pharmacy.   Thank you,   Enriqueta DENNIS CMA

## 2022-02-15 NOTE — PROGRESS NOTES
Patient:   YASMIN SILVESTRE            MRN: [id number]            FIN: [id number]               Age:   55 years     Sex:  Male     :  1965   Associated Diagnoses:   ASHD (arteriosclerotic heart disease); BPH (benign prostatic hyperplasia); HLD (hyperlipidemia); Prediabetes   Author:   David Sandra MD      Visit Information      Date of Service: 2020 11:40 am  Performing Location: [facility]  Encounter#: [id number]      Chief Complaint   2020 11:47 AM CST  chronic disease visit        Interval History   chief complaint and symptoms as noted above confirmed with patient   Patient is in today for follow-up on his hyperlipidemia BPH and prediabetes.  Overall has been doing well.  He does note he has been out doing a lot of hunting is developed some soreness in his right first toe deposit bothers him when he is walking around the house in the slippers is not too bad when he is out hunting sometimes it can no clicks and snaps.  Is been doing that for about a month.           Review of Systems   Respiratory:  Negative.    Cardiovascular:  Negative.    Musculoskeletal:  Negative.    Neurologic:  Negative.       Health Status   Allergies:    Allergic Reactions (Selected)  No known allergies   Medications:  (Selected)   Prescriptions  Prescribed  Flomax 0.4 mg oral capsule: = 1 cap(s) ( 0.4 mg ), Oral, daily, # 90 cap(s), 1 Refill(s), Type: Maintenance, Pharmacy: [facility], 1 cap(s) Oral daily, 65.5, in, 20 11:47:00 CST, Height Measured, 174.6, lb, 20 11:47:00 CST, Weight Measured  aspirin 81 mg oral tablet: 1 tab(s) ( 81 mg ), PO, Daily, # 30 tab(s), 0 Refill(s), Type: Soft Stop, OTC (Rx)  atorvastatin 80 mg oral tablet: = 1 tab(s), Oral, daily, # 90 tab(s), 1 Refill(s), Type: Maintenance, Pharmacy: [facility], 1 tab(s) Oral daily, 65.5, in, 20 11:47:00 CST, Height Measured, 174.6, lb, 20 11:47:00 CST, Weight  Measured  Documented Medications  Documented  Coenzyme Q10: ( 200 mg ), po, daily, cap(s), 0 Refill(s), Type: Maintenance  Fish Oil: 1 tab(s), po, bid, tab(s), 0 Refill(s), Type: Maintenance  Melatonin 3 mg oral tablet: = 1 tab(s) ( 3 mg ), Oral, 0 Refill(s), Type: Maintenance  Multiple Vitamins oral tablet: 1 tab(s), po, daily, 0 Refill(s), Type: Maintenance  Nasacort: daily, 0 Refill(s), Type: Maintenance  glucosamine: PO, Daily, 0   Problem list:    All Problems (Selected)  BPH (benign prostatic hyperplasia) / SNOMED CT 398925045 / Confirmed  ASHD (arteriosclerotic heart disease) / SNOMED CT 00355372 / Confirmed  HLD (hyperlipidemia) / SNOMED CT 338332614 / Confirmed  Prediabetes / SNOMED CT 64012063 / Confirmed  Joint pain / SNOMED CT 53628782 / Confirmed  Kidney stone / SNOMED CT 540272716 / Confirmed  Tennis elbow / SNOMED CT 444244415 / Confirmed  Seasonal allergies / SNOMED CT 681340053 / Confirmed  Urinary incontinence / SNOMED CT 7875206982 / Confirmed      Histories   Past Medical History:    Active  Tennis elbow (585305386)  ASHD (arteriosclerotic heart disease) (78290123)  HLD (hyperlipidemia) (558207159)  Seasonal allergies (094807946)  Resolved  History of chicken pox (492908810):  Resolved.   Family History:    Diabetes mellitus  Father (Randy)  Hypertension  Mother (Erika)  Heart disease  Mother (Erika)  Cancer of colon  Grandfather (P)  Comments:  4/19/2011 7:27 PM RENETTA - Elba Lindsay  80s  Crohn's disease  Sister (Anjelica)  MI - Myocardial infarction  Uncle (P)  Comments:  4/19/2011 7:27 PM CDT - Elba Lindsay  50s  Father (Randy)  Comments:  4/19/2011 7:29 PM MELANIET - Elba Lindsay  early 50s  ASHD - Atherosclerotic heart disease  Father (Randy)     Procedure history:    Colonoscopy (SNOMED CT 690506631) performed by Nacho Villalobos MD on 7/25/2017 at 51 Years.  Comments:  8/1/2017 10:38 AM CDT - Silvana Vega RN  Indication: screening  Sedation: fentanyl and versed  Findings: normal  colonoscopy  F/U: 10 years  Cardiac computed tomography for calcium scoring (SNOMED CT 9360654604) on 5/17/2017 at 51 Years.  Comments:  5/19/2017 2:23 PM CDT - Marly Haredn  Total coronary calcium score is 182  Excision of sebaceous cyst (SNOMED CT 390092114) performed by Kyle Pike MD on 6/16/2016 at 50 Years.  Comments:  6/21/2016 1:14 PM CDT - Maisha Mchugh  Chest.  CT Cardiac Calcium Score on 11/17/2008 at 43 Years.  Comments:  5/7/2013 9:10 AM CDT - Briseida Romero  Date of test: 11/17/08.  Score: 159   Social History:        Electronic Cigarette/Vaping Assessment            Electronic Cigarette Use: Never.      Alcohol Assessment            Current, Beer (12 oz), 1 time per week, 1 drinks/episode average.  2 drinks/episode maximum.      Tobacco Assessment            Never (less than 100 in lifetime)      Substance Abuse Assessment            Never      Employment and Education Assessment            Employed, Work/School description: Insurance Claim Supervisor.      Home and Environment Assessment            Marital status: .  Spouse/Partner name: Corina pandya.  Risks in environment: owns secured gun.      Nutrition and Health Assessment            Type of diet: Regular.      Exercise and Physical Activity Assessment            Exercise frequency: Daily.  Exercise type: Walking.      Sexual Assessment            Sexually active: Yes.        Physical Examination   Vital Signs   12/11/2020 11:47 AM CST Peripheral Pulse Rate 66 bpm    HR Method Electronic    Systolic Blood Pressure 127 mmHg    Diastolic Blood Pressure 77 mmHg    Mean Arterial Pressure 94 mmHg    BP Method Electronic      Measurements from flowsheet : Measurements   12/11/2020 11:47 AM CST Height Measured - Standard 65.5 in    Weight Measured - Standard 174.6 lb    BSA 1.91 m2    Body Mass Index 28.61 kg/m2  HI      General:  Alert and oriented, No acute distress.    Neck:  Supple, Non-tender.    Respiratory:  Lungs are clear to  auscultation, Respirations are non-labored.    Cardiovascular:  Normal rate, Regular rhythm.    Gastrointestinal:  Soft, Non-tender.    Neurologic:  Alert, Oriented.       Review / Management   Results review      Impression and Plan   Diagnosis     ASHD (arteriosclerotic heart disease) (TOY13-UZ I25.10).     BPH (benign prostatic hyperplasia) (DXO87-OT N40.0).     HLD (hyperlipidemia) (GUQ50-YF E78.5).     Prediabetes (XWS19-JR R73.09).     Course:  Progressing as expected.    Plan:  Problem #1 hyperlipidemia with vascular heart disease by cardiac calcium score we will continue with atorvastatin No. 2 BPH and kidney stones on Flomax per urology #3 prediabetes up-to-date on labs fine before  4.  Right first toe pain MTP has some degenerative changes likely the cause of his symptoms with the over use if he does not see improvement over the next few months we will send him to podiatry  He is moving at the end of May next year     .    Patient Instructions:       Counseled: Patient, Regarding diagnosis, Regarding treatment, Regarding medications.

## 2022-02-15 NOTE — NURSING NOTE
Comprehensive Intake Entered On:  5/22/2020 9:48 AM CDT    Performed On:  5/22/2020 9:44 AM CDT by Sarah Beth Rinaldi CMA               Summary   Chief Complaint :   f/u chronic disease. Pt is fasting.    Weight Measured :   173 lb(Converted to: 173 lb 0 oz, 78.47 kg)    Height Measured :   65.5 in(Converted to: 5 ft 5 in, 166.37 cm)    Body Mass Index :   28.35 kg/m2 (HI)    Body Surface Area :   1.9 m2   Systolic Blood Pressure :   121 mmHg   Diastolic Blood Pressure :   74 mmHg   Mean Arterial Pressure :   90 mmHg   Peripheral Pulse Rate :   57 bpm (LOW)    BP Site :   Right arm   Pulse Site :   Radial artery   BP Method :   Electronic   HR Method :   Electronic   Temperature Tympanic :   97.1 DegF(Converted to: 36.2 DegC)  (LOW)    Sarah Beth Rinaldi CMA - 5/22/2020 9:44 AM CDT   Health Status   Allergies Verified? :   Yes   Medication History Verified? :   Yes   Pre-Visit Planning Status :   Completed   Tobacco Use? :   Never smoker   Sarah Beth Rinaldi CMA - 5/22/2020 9:44 AM CDT   Meds / Allergies   (As Of: 5/22/2020 9:48:08 AM CDT)   Allergies (Active)   No known allergies  Estimated Onset Date:   Unspecified ; Created By:   Nora Cerda; Reaction Status:   Active ; Category:   Drug ; Substance:   No known allergies ; Type:   Allergy ; Updated By:   Nora Cerda; Reviewed Date:   5/10/2019 9:06 AM CDT        Medication List   (As Of: 5/22/2020 9:48:08 AM CDT)   Prescription/Discharge Order    aspirin  :   aspirin ; Status:   Prescribed ; Ordered As Mnemonic:   aspirin 81 mg oral tablet ; Simple Display Line:   81 mg, 1 tab(s), PO, Daily, 30 tab(s) ; Ordering Provider:   David Sandra MD; Catalog Code:   aspirin ; Order Dt/Tm:   5/31/2012 3:22:03 PM CDT          atorvastatin  :   atorvastatin ; Status:   Prescribed ; Ordered As Mnemonic:   atorvastatin 80 mg oral tablet ; Simple Display Line:   1 tab(s), Oral, daily, 90 tab(s), 0 Refill(s) ; Ordering Provider:   David Sandra MD; Catalog Code:    atorvastatin ; Order Dt/Tm:   5/8/2020 10:00:15 AM CDT          tamsulosin  :   tamsulosin ; Status:   Prescribed ; Ordered As Mnemonic:   Flomax 0.4 mg oral capsule ; Simple Display Line:   0.4 mg, 1 cap(s), Oral, daily, 90 cap(s), 3 Refill(s) ; Ordering Provider:   David Sandra MD; Catalog Code:   tamsulosin ; Order Dt/Tm:   3/20/2020 8:31:53 AM CDT            Home Meds    glucosamine  :   glucosamine ; Status:   Documented ; Ordered As Mnemonic:   glucosamine ; Simple Display Line:   PO, Daily ; Catalog Code:   glucosamine ; Order Dt/Tm:   2/3/2010 1:36:13 PM CST          melatonin  :   melatonin ; Status:   Documented ; Ordered As Mnemonic:   Melatonin 3 mg oral tablet ; Simple Display Line:   3 mg, 1 tab(s), Oral, 0 Refill(s) ; Catalog Code:   melatonin ; Order Dt/Tm:   5/10/2019 9:07:32 AM CDT          multivitamin  :   multivitamin ; Status:   Documented ; Ordered As Mnemonic:   Multiple Vitamins oral tablet ; Simple Display Line:   1 tab(s), po, daily ; Catalog Code:   multivitamin ; Order Dt/Tm:   4/29/2015 8:53:31 AM CDT          omega-3 polyunsaturated fatty acids  :   omega-3 polyunsaturated fatty acids ; Status:   Documented ; Ordered As Mnemonic:   Fish Oil ; Simple Display Line:   1 tab(s), po, bid, tab(s) ; Catalog Code:   omega-3 polyunsaturated fatty acids ; Order Dt/Tm:   2/3/2010 1:36:04 PM CST          triamcinolone nasal  :   triamcinolone nasal ; Status:   Documented ; Ordered As Mnemonic:   Nasacort ; Simple Display Line:   daily ; Catalog Code:   triamcinolone nasal ; Order Dt/Tm:   5/8/2014 1:00:16 PM CDT          ubiquinone  :   ubiquinone ; Status:   Documented ; Ordered As Mnemonic:   Coenzyme Q10 ; Simple Display Line:   200 mg, po, daily, cap(s) ; Catalog Code:   ubiquinone ; Order Dt/Tm:   4/21/2011 9:04:54 AM CDT            ID Risk Screen   Recent Travel History :   No recent travel   Family Member Travel History :   No recent travel   Other Exposure to Infectious Disease :    Unknown   Sarah Beth Rnialdi CMA - 5/22/2020 9:44 AM CDT

## 2022-02-15 NOTE — NURSING NOTE
Quick Intake Entered On:  1/28/2019 10:11 AM CST    Performed On:  1/28/2019 10:10 AM CST by Sykla Suresh LPN               Summary   Chief Complaint :   CSS onluy BP check   Systolic Blood Pressure :   110 mmHg   Diastolic Blood Pressure :   68 mmHg   Mean Arterial Pressure :   82 mmHg   Peripheral Pulse Rate :   55 bpm (LOW)    BP Site :   Right arm   BP Method :   Manual   HR Method :   Electronic   Skyla Suresh LPN - 1/28/2019 10:10 AM CST

## 2022-02-15 NOTE — PROGRESS NOTES
Patient:   YASMIN SILVESTRE            MRN: [id number]            FIN: [id number]               Age:   51 years     Sex:  Male     :  1965   Associated Diagnoses:   Annual exam; HLD (Hyperlipidemia); ASHD (Arteriosclerotic Heart Disease); Prediabetes   Author:   David Sandra MD      Visit Information      Date of Service: 2017 08:53 am  Performing Location: [facility]  Encounter#: [id number]      Primary Care Provider (PCP):  David Sandra MD    NPI# [id number]      Referring Provider:  No referring provider recorded for selected visit.      Chief Complaint   2017 8:57 AM CDT     Px     Routine Health Care Visit      History of Present Illness   Doing well no concerns             The patient presents for a general exam.  The patient's general health status is described as good.  The patient's diet is described as balanced.  Exercise: occasional.  Associated symptoms consist of none.  Medical encounters: none.  Compliance problems: none.  Complaint: Occasional back pain for years   no change of family history over last year.  Additional pertinent history: occasional caffeine use, tobacco use none and alcohol use socially.     Will retry lipitor if return of headache switch to crestor  Chronic achiles tendonitis      Review of Systems   Constitutional:  Negative.    Eye:  Negative.    Ear/Nose/Mouth/Throat:  Negative.    Respiratory:  Negative.    Cardiovascular:  Negative.    Gastrointestinal:  Negative.    Genitourinary:  Negative.    Hematology/Lymphatics:  Negative.    Endocrine:  Negative.    Immunologic:  Negative.    Musculoskeletal:  Negative except as documented in history of present illness.    Integumentary:  Negative.    Neurologic:  Negative.    Psychiatric:  Negative.    All other systems reviewed and negative      Health Status   Allergies:    Allergic Reactions (Selected)  No known allergies   Medications:  (Selected)   Prescriptions  Prescribed  aspirin 81 mg  oral tablet: 1 tab(s) ( 81 mg ), PO, Daily, # 30 tab(s), 0 Refill(s), Type: Soft Stop, OTC (Rx)  simvastatin 20 mg oral tablet: 1 tab(s) ( 20 mg ), PO, Once a day (at bedtime), # 90 tab(s), 1 Refill(s), Type: Maintenance, Pharmacy: [facility], 1 tab(s) po hs  Documented Medications  Documented  Coenzyme Q10: ( 200 mg ), po, daily, cap(s), 0 Refill(s), Type: Maintenance  Fish Oil: 1 tab(s), po, bid, tab(s), 0 Refill(s), Type: Maintenance  Multiple Vitamins oral tablet: 1 tab(s), po, daily, 0 Refill(s), Type: Maintenance  Nasacort: daily, 0 Refill(s), Type: Maintenance  Probiotic Formula: 1 cap(s), po, daily, 0 Refill(s), Type: Maintenance  glucosamine: PO, Daily, 0   Problem list:    All Problems  Tennis elbow / SNOMED CT 979276263 / Confirmed  ASHD (arteriosclerotic heart disease) / SNOMED CT 03763562 / Confirmed  HLD (hyperlipidemia) / SNOMED CT 917314911 / Confirmed  Prediabetes / SNOMED CT 07662703 / Confirmed  Resolved: History of chicken pox / SNOMED CT 959201409  Canceled: Hx of abnormal cardiac calcium score      Histories   Past Medical History:    Active  Tennis elbow (672197710)  ASHD (arteriosclerotic heart disease) (71852620)  HLD (hyperlipidemia) (760675180)  Resolved  History of chicken pox (960351956):  Resolved.   Family History:    Mother: Erika    Heart disease  Hypertension  Father: Randy    ASHD - Atherosclerotic heart disease  MI - Myocardial infarction  Comments:  4/19/2011 7:29 PM - Elba Lindsay  early 50s  Diabetes mellitus  Sister: Lakesha      History is unknown.  Sister: Nadine      History is unknown.  Daughter: Dai    Prediabetes  Grandfather (P)  Cancer of colon  Comments:  4/19/2011 7:27 PM - Elba Lindsay  80s  Uncle (P)  MI - Myocardial infarction  Comments:  4/19/2011 7:27 PM - Elba Lindsay  50s  Sister: Anjelica    Crohn's disease     Procedure history:    Excision of sebaceous cyst (SNOMED CT 754123262) performed by Kyle Pike MD on 6/16/2016 at 50  Years.  Comments:  6/21/2016 1:14 PM - Maisha Mchugh  Chest.  CT Cardiac Calcium Score on 11/17/2008 at 43 Years.  Comments:  5/7/2013 9:10 AM - Briseida Romero  Date of test: 11/17/08.  Score: 159   Social History:        Alcohol Assessment: Current            Current, Beer (12 oz), 1-2 times per week, 1 drinks/episode average.      Tobacco Assessment: Denies Tobacco Use            Never      Substance Abuse Assessment            Never      Employment and Education Assessment            Employed, Work/School description: Insurance Claim Supervisor.      Home and Environment Assessment            Marital status: .  Spouse/Partner name: Corina pandya.      Nutrition and Health Assessment            Type of diet: Regular.      Exercise and Physical Activity Assessment: Regular exercise            Exercise frequency: Daily.  Exercise type: Walking.      Sexual Assessment            Sexually active: Yes.        Physical Examination   Vital Signs   5/4/2017 8:57 AM CDT Temperature Tympanic 97.2 DegF  LOW    Peripheral Pulse Rate 57 bpm  LOW    Systolic Blood Pressure 121 mmHg    Diastolic Blood Pressure 78 mmHg    Mean Arterial Pressure 92 mmHg      Measurements from flowsheet : Measurements   5/4/2017 8:57 AM CDT Height Measured - Standard 65.5 in    Weight Measured - Standard 174.4 lb    BSA 1.91 m2    Body Mass Index 28.58 kg/m2      General:  Alert and oriented.    Eye:  Pupils are equal, round and reactive to light, Normal conjunctiva.    HENT:  Normocephalic, Tympanic membranes are clear, Oral mucosa is moist.    Neck:  Supple, Non-tender, No lymphadenopathy, No thyromegaly.    Respiratory:  Breath sounds are equal, Symmetrical chest wall expansion.         Respirations: Are within normal limits.         Pattern: Regular.         Breath sounds: Bilateral, Within normal limits.    Cardiovascular:  Normal rate, Regular rhythm, No murmur, Good pulses equal in all extremities, Normal peripheral perfusion, No edema.     Breast:  No mass.         Lymph nodes: Within normal limits.    Gastrointestinal:  Soft, Non-tender, Non-distended, Normal bowel sounds.    Musculoskeletal:  Normal range of motion, Normal strength, No swelling, tender left achiles.    Integumentary:  Warm, Dry.    Neurologic:  No focal deficits.    Cognition and Speech:  Oriented, Speech clear and coherent.    Psychiatric:  Appropriate mood & affect, Normal judgment.       Health Maintenance      Recommendations     Pending (in the next year)        Due            Alcohol Misuse Screen (Male) due  05/03/17  and every 1  year(s)           Depression Screen (Male) due  05/03/17  and every 1  year(s)           Colorectal Cancer Screen (Colonoscopy) (Male) due  05/04/17  and every 10  year(s)           Colorectal Cancer Screen (Occult Blood) (Male) due  05/04/17  Variable frequency           Colorectal Cancer Screen (Sigmoidoscopy) (Male) due  05/04/17  Variable frequency        Near Due            Aspirin Therapy for Prevention of CVD (Male) near due  05/31/17  and every 5  year(s)        Due In Future            Influenza Vaccine not due until  10/25/17  and every 1  year(s)           Lipid Disorders Screen (Male) not due until  04/24/18  and every 1  year(s)     Satisfied (in the past 1 year)        Satisfied            Body Mass Index Check (Male) on  05/04/17.           Body Mass Index Check (Male) on  06/01/16.           High Blood Pressure Screen (Male) on  05/04/17.           High Blood Pressure Screen (Male) on  07/01/16.           High Blood Pressure Screen (Male) on  06/01/16.           Influenza Vaccine on  10/25/16.           Lipid Disorders Screen (Male) on  04/24/17.           Lipid Disorders Screen (Male) on  04/24/17.           Lipid Disorders Screen (Male) on  04/24/17.           Lipid Disorders Screen (Male) on  04/24/17.           Tobacco Use Screen (Male) on  05/04/17.           Tobacco Use Screen (Male) on  07/01/16.           Tobacco Use Screen  (Male) on  06/01/16.          Review / Management   Results review:  Lab results   4/24/2017 8:00 AM CDT Glucose Level 98 mg/dL    AST/SGOT 22 unit/L    Cholesterol 141 mg/dL    Non-HDL Cholesterol 77    HDL 64 mg/dL    Cholesterol/HDL Ratio 2.2    LDL 67    Triglyceride 48 mg/dL   .       Impression and Plan   Diagnosis     Annual exam (VPS05-YF Z00.00).     HLD (Hyperlipidemia) (UDG39-XE E78.5).     ASHD (Arteriosclerotic Heart Disease) (YEA95-VB I25.10).     Prediabetes (OHW31-PN R73.09).     Course:  Progressing as expected.    Plan:  fu 1 yr, pt for achiles  statin as above  colocoscopy for screen  a1c.    Patient Instructions:       Counseled: Patient, Diet, Activity, Verbalized understanding.    Counseled:  Patient, Routine exercise and healthy weight maintenance discussed.    Patient Instructions:  Return to clinic in one year for next routine health visit, or sooner if problems or concerns.

## 2022-02-15 NOTE — TELEPHONE ENCOUNTER
Entered by Marleni Santacruz CMA on August 19, 2021 1:25:40 PM CDT  ---------------------  From: Marleni Santacruz CMA   To: [facility]    Sent: 8/19/2021 1:25:40 PM CDT  Subject: Medication Management     ** Not Approved: Patient no longer under Prescriber care **  tamsulosin (TAMSULOSIN HCL CAPS 0.4MG)  TAKE 1 CAPSULE DAILY  Qty:  90 cap(s)        Days Supply:  0        Refills:  3          Substitutions Allowed     Route To Pharmacy - [facility]   Signed by Marleni Santacruz CMA            ------------------------------------------  From: [facility]  To: David Sandra MD  Sent: August 18, 2021 11:34:21 PM CDT  Subject: Medication Management  Due: August 18, 2021 11:09:29 AM CDT     ** On Hold Pending Signature **     Drug: tamsulosin (tamsulosin 0.4 mg oral capsule), TAKE 1 CAPSULE DAILY  Quantity: 90 cap(s)  Days Supply: 0  Refills: 3  Substitutions Allowed  Notes from Pharmacy:     Dispensed Drug: tamsulosin (tamsulosin 0.4 mg oral capsule), TAKE 1 CAPSULE DAILY  Quantity: 90 cap(s)  Days Supply: 0  Refills: 3  Substitutions Allowed  Notes from Pharmacy:  ------------------------------------------

## 2022-02-15 NOTE — PROGRESS NOTES
Patient:   YASMIN SILVESTRE            MRN: [id number]            FIN: [id number]               Age:   52 years     Sex:  Male     :  1965   Associated Diagnoses:   Kidney stone   Author:   Raghu Daly MD      Chief Complaint   2018 9:59 AM CDT    Follow-up kidney stone, was seen in ER yesterday.  Pain is not under control.      History of Present Illness   see chief complaint as noted above and confirmed with the patient   52 year old male here with his wife following up on ER visit. He was seen yesterday in the ER for Abdominal pain and was diagnosised with a 2mm right ureteral stone with mild hydronephroisis. Felt his pain was well controlled yesterday until Last night. Pain moved into his lower back last night  and lower right groin. Pain levels  became worse and was around a 9 on the pain scale. He struggled to do anything more than laying in bed. He was given oxycodone but did not feel this helped with the pain. Started having some nausea this morning. Pain level has decreased and staying at a 2 this morning. Did have problems urinating yesterday but this has resolved. He is currently taking Uroxatral.      Review of Systems   Constitutional:  No fever, No chills.    Ear/Nose/Mouth/Throat:  Negative.    Respiratory:  No shortness of breath.    Cardiovascular:  No chest pain.    Gastrointestinal:  No nausea, No vomiting     Abdominal pain: Right, Lower quadrant.    Genitourinary:  Kidney stone.    Musculoskeletal:       Back pain: On the right side, In the lower region.    Integumentary:  No rash.              Health Status   Allergies:    Allergic Reactions (Selected)  No known allergies   Medications:  (Selected)   Prescriptions  Prescribed  Lipitor 80 mg oral tablet: 1 tab(s) ( 80 mg ), PO, Daily, # 90 tab(s), 3 Refill(s), Type: Maintenance, Pharmacy: [facility], 1 tab(s) po daily  Uroxatral 10 mg oral tablet, extended release: 1 tab(s) ( 10 mg ), po, daily, # 90 tab(s), 3  Refill(s), Type: Maintenance, Pharmacy: [facility], 1 tab(s) po daily  aspirin 81 mg oral tablet: 1 tab(s) ( 81 mg ), PO, Daily, # 30 tab(s), 0 Refill(s), Type: Soft Stop, OTC (Rx)  Documented Medications  Documented  Coenzyme Q10: ( 200 mg ), po, daily, cap(s), 0 Refill(s), Type: Maintenance  Fish Oil: 1 tab(s), po, bid, tab(s), 0 Refill(s), Type: Maintenance  Multiple Vitamins oral tablet: 1 tab(s), po, daily, 0 Refill(s), Type: Maintenance  Nasacort: daily, 0 Refill(s), Type: Maintenance  glucosamine: PO, Daily, 0   Problem list:    All Problems  Seasonal allergies / SNOMED CT 273926521 / Confirmed  Tennis elbow / SNOMED CT 033967019 / Confirmed  Prediabetes / SNOMED CT 65757653 / Confirmed  HLD (hyperlipidemia) / SNOMED CT 511339181 / Confirmed  ASHD (arteriosclerotic heart disease) / SNOMED CT 18288488 / Confirmed  Resolved: History of chicken pox / SNOMED CT 784881004  Canceled: Hx of abnormal cardiac calcium score      Histories   Past Medical History:    Active  Tennis elbow (464741423)  ASHD (arteriosclerotic heart disease) (86445939)  HLD (hyperlipidemia) (734895654)  Seasonal allergies (102413383)  Resolved  History of chicken pox (325386115):  Resolved.   Family History:    Diabetes mellitus  Father (Randy)  Hypertension  Mother (Erika)  Heart disease  Mother (Erika)  Prediabetes  Daughter (Dai)  Cancer of colon  Grandfather (P)  Comments:  4/19/2011 7:27 PM - Elba Lindsay  80s  Crohn's disease  Sister (Anjelica)  MI - Myocardial infarction  Uncle (P)  Comments:  4/19/2011 7:27 PM - Elba Lindsay  50s  Father (Randy)  Comments:  4/19/2011 7:29 PM - Elba Lindsay  early 50s  ASHD - Atherosclerotic heart disease  Father (Randy)     Procedure history:    Colonoscopy (051874778) on 7/25/2017 at 51 Years.  Comments:  8/1/2017 10:38 AM - Vega RN, Silvana  Indication: screening  Sedation: fentanyl and versed  Findings: normal colonoscopy  F/U: 10 years  Cardiac computed tomography for  calcium scoring (0487759390) on 5/17/2017 at 51 Years.  Comments:  5/19/2017 2:23 PM - Marly Harden  Total coronary calcium score is 182  Excision of sebaceous cyst (225723721) on 6/16/2016 at 50 Years.  Comments:  6/21/2016 1:14 PM - Maisha Mchugh  Chest.  CT Cardiac Calcium Score on 11/17/2008 at 43 Years.  Comments:  5/7/2013 9:10 AM - Briseida Romero  Date of test: 11/17/08.  Score: 159   Social History:        Alcohol Assessment            Current, Beer (12 oz), 1 time per week, 1 drinks/episode average.  2 drinks/episode maximum.      Tobacco Assessment            Never      Substance Abuse Assessment            Never      Employment and Education Assessment            Employed, Work/School description: Insurance Claim Supervisor.      Home and Environment Assessment            Marital status: .  Spouse/Partner name: Corina pandya.  Risks in environment: owns secured gun.      Nutrition and Health Assessment            Type of diet: Regular.      Exercise and Physical Activity Assessment            Exercise frequency: Daily.  Exercise type: Walking.      Sexual Assessment            Sexually active: Yes.        Physical Examination   Vital Signs   6/12/2018 9:59 AM CDT Temperature Tympanic 98.0 DegF    Peripheral Pulse Rate 58 bpm  LOW    Pulse Site Radial artery    HR Method Manual    Systolic Blood Pressure 126 mmHg    Diastolic Blood Pressure 82 mmHg  HI    Mean Arterial Pressure 97 mmHg    BP Site Right arm    BP Method Manual      Measurements from flowsheet : Measurements   6/12/2018 9:59 AM CDT    Weight Measured - Standard                178 lb     General:  Alert and oriented, No acute distress.    Eye:  Pupils are equal, round and reactive to light, Normal conjunctiva.    HENT:  Oral mucosa is moist.    Neck:  Supple.    Respiratory:  Respirations are non-labored.    Cardiovascular:  Normal rate, Regular rhythm, No edema.    Gastrointestinal:  Non-distended.    Musculoskeletal:  Normal gait.     Integumentary:  Warm, No rash.    Psychiatric:  Cooperative, Appropriate mood & affect, Normal judgment.       Review / Management   Results review      Impression and Plan   Diagnosis     Kidney stone (RLT85-ER N20.0).     Plan:  Discussed options for pain control. Can take ibuprofen or naprosyn. It's ok to take 2 tablets of oxycodone at a time. Will have him follow up with urology if pain gets worse. Will call if he needs a refill of oxycodone.  spent 25 minutes in discussion with patient  I, Dasha Sharma CMA, acted solely as a scribe for, and in the presence of Dr. Raghu Daly who performed the service..

## 2022-02-15 NOTE — PROGRESS NOTES
Patient:   YASMIN SILVESTRE            MRN: [id number]            FIN: [id number]               Age:   54 years     Sex:  Male     :  1965   Associated Diagnoses:   ASHD (arteriosclerotic heart disease); BPH (benign prostatic hyperplasia); HLD (hyperlipidemia); Prediabetes   Author:   David Sandra MD      Visit Information      Date of Service: 2020 09:40 am  Performing Location: [facility]  Encounter#: [id number]      Chief Complaint   2020 9:44 AM CDT    f/u chronic disease. Pt is fasting.        Interval History   chief complaint and symptoms as noted above confirmed with patient   Overall patient s been doing well.  Continues on his atorvastatin for his hyperlipidemia.  Watches his diet for his prediabetes takes his Flomax for his BPH although is not working as good as it should.  No further kidney stone issues.         Review of Systems   Respiratory:  Negative.    Cardiovascular:  Negative.    Musculoskeletal:  Negative.    Neurologic:  Negative.       Health Status   Allergies:    Allergic Reactions (Selected)  No known allergies   Medications:  (Selected)   Prescriptions  Prescribed  Flomax 0.4 mg oral capsule: = 1 cap(s) ( 0.4 mg ), Oral, daily, # 90 cap(s), 3 Refill(s), Type: Maintenance, Pharmacy: [facility], 1 cap(s) Oral daily  aspirin 81 mg oral tablet: 1 tab(s) ( 81 mg ), PO, Daily, # 30 tab(s), 0 Refill(s), Type: Soft Stop, OTC (Rx)  atorvastatin 80 mg oral tablet: = 1 tab(s), Oral, daily, # 90 tab(s), 1 Refill(s), Type: Maintenance, Pharmacy: [facility], 1 tab(s) Oral daily, 65.5, in, 20 9:44:00 CDT, Height Measured, 173, lb, 20 9:44:00 CDT, Weight Measured  Documented Medications  Documented  Coenzyme Q10: ( 200 mg ), po, daily, cap(s), 0 Refill(s), Type: Maintenance  Fish Oil: 1 tab(s), po, bid, tab(s), 0 Refill(s), Type: Maintenance  Melatonin 3 mg oral tablet: = 1 tab(s) ( 3 mg ), Oral, 0 Refill(s), Type:  Maintenance  Multiple Vitamins oral tablet: 1 tab(s), po, daily, 0 Refill(s), Type: Maintenance  Nasacort: daily, 0 Refill(s), Type: Maintenance  glucosamine: PO, Daily, 0   Problem list:    All Problems (Selected)  ASHD (arteriosclerotic heart disease) / SNOMED CT 91687149 / Confirmed  HLD (hyperlipidemia) / SNOMED CT 091996029 / Confirmed  Prediabetes / SNOMED CT 38715174 / Confirmed  Joint pain / SNOMED CT 41369682 / Confirmed  Kidney stone / SNOMED CT 942858562 / Confirmed  Tennis elbow / SNOMED CT 073442269 / Confirmed  Seasonal allergies / SNOMED CT 728178931 / Confirmed  Urinary incontinence / SNOMED CT 7692739761 / Confirmed      Histories   Past Medical History:    Active  Tennis elbow (877437262)  ASHD (arteriosclerotic heart disease) (21610213)  HLD (hyperlipidemia) (773576469)  Seasonal allergies (609069902)  Resolved  History of chicken pox (210602989):  Resolved.   Family History:    Diabetes mellitus  Father (Randy)  Hypertension  Mother (Erika)  Heart disease  Mother (Erika)  Cancer of colon  Grandfather (P)  Comments:  4/19/2011 7:27 PM CDT - Elba Lindsay  80s  Crohn's disease  Sister (Anjelica)  MI - Myocardial infarction  Uncle (P)  Comments:  4/19/2011 7:27 PM MELANIET - Elba Lindsay  50s  Father (Randy)  Comments:  4/19/2011 7:29 PM CDT - Elba Lindsay  early 50s  ASHD - Atherosclerotic heart disease  Father (Ranyd)     Procedure history:    Colonoscopy (SNOMED CT 360101860) performed by Nacho Villalobos MD on 7/25/2017 at 51 Years.  Comments:  8/1/2017 10:38 AM CDT - Silvana Vega RN  Indication: screening  Sedation: fentanyl and versed  Findings: normal colonoscopy  F/U: 10 years  Cardiac computed tomography for calcium scoring (SNOMED CT 6191590856) on 5/17/2017 at 51 Years.  Comments:  5/19/2017 2:23 PM CDT - Marly Harden  Total coronary calcium score is 182  Excision of sebaceous cyst (SNOMED CT 454658141) performed by Kyle Pike MD on 6/16/2016 at 50 Years.  Comments:  6/21/2016  1:14 PM CDT - Maisha Mchugh  Chest.  CT Cardiac Calcium Score on 11/17/2008 at 43 Years.  Comments:  5/7/2013 9:10 AM CDT - Briseida Romero  Date of test: 11/17/08.  Score: 159   Social History:        Alcohol Assessment            Current, Beer (12 oz), 1 time per week, 1 drinks/episode average.  2 drinks/episode maximum.      Tobacco Assessment            Never      Substance Abuse Assessment            Never      Employment and Education Assessment            Employed, Work/School description: Insurance Claim Supervisor.      Home and Environment Assessment            Marital status: .  Spouse/Partner name: Corina pandya.  Risks in environment: owns secured gun.      Nutrition and Health Assessment            Type of diet: Regular.      Exercise and Physical Activity Assessment            Exercise frequency: Daily.  Exercise type: Walking.      Sexual Assessment            Sexually active: Yes.        Physical Examination   Vital Signs   5/22/2020 9:44 AM CDT Temperature Tympanic 97.1 DegF  LOW    Peripheral Pulse Rate 57 bpm  LOW    Pulse Site Radial artery    HR Method Electronic    Systolic Blood Pressure 121 mmHg    Diastolic Blood Pressure 74 mmHg    Mean Arterial Pressure 90 mmHg    BP Site Right arm    BP Method Electronic      Measurements from flowsheet : Measurements   5/22/2020 9:44 AM CDT Height Measured - Standard 65.5 in    Weight Measured - Standard 173 lb    BSA 1.9 m2    Body Mass Index 28.35 kg/m2  HI      General:  Alert and oriented, No acute distress.    Neck:  Supple, Non-tender.    Respiratory:  Lungs are clear to auscultation, Respirations are non-labored.    Cardiovascular:  Normal rate, Regular rhythm.    Gastrointestinal:  Soft, Non-tender.    Neurologic:  Alert, Oriented.       Review / Management   Results review      Impression and Plan   Diagnosis     ASHD (arteriosclerotic heart disease) (XFX87-DD I25.10).     BPH (benign prostatic hyperplasia) (PRJ52-LU N40.0).     HLD  (hyperlipidemia) (RKS47-LV E78.5).     Prediabetes (TGJ20-HM R73.09).     Course:  Progressing as expected.    Plan:  We will make no changes at this point.  We will renew his meds for a year.  Follow-up then.  Labs today  .    Patient Instructions:       Counseled: Patient, Regarding diagnosis, Regarding treatment, Regarding medications.

## 2022-02-15 NOTE — PROGRESS NOTES
Patient:   YASMIN SILVESTRE            MRN: [id number]            FIN: [id number]               Age:   54 years     Sex:  Male     :  1965   Associated Diagnoses:   Achilles tendinitis   Author:   David Sandra MD      Preoperative Information   Indication for surgery:  Chronic achiles tendonitis.    Accompanied by:  No one.    Source of history:  Self.    History limitation:  None.       Chief Complaint   12/3/2019 4:51 PM CST    Preop.  Left achiles tendon repair.  [facility]. Dr. Zhu        Review of Systems   Constitutional:  Negative.    Eye:  Negative.    Ear/Nose/Mouth/Throat:  Negative.    Respiratory:  Negative.    Cardiovascular:  Negative.    Gastrointestinal:  Negative.    Genitourinary:  Negative.    Hematology/Lymphatics:  Negative.    Endocrine:  Negative.    Immunologic:  Negative.    Musculoskeletal:  Negative.    Integumentary:  Negative.    Neurologic:  Negative.       Health Status   Allergies:    Allergic Reactions (Selected)  No known allergies   Medications:  (Selected)   Prescriptions  Prescribed  Flomax 0.4 mg oral capsule: = 1 cap(s) ( 0.4 mg ), Oral, daily, # 90 cap(s), 1 Refill(s), Type: Maintenance, Pharmacy: [facility], 1 cap(s) Oral daily  aspirin 81 mg oral tablet: 1 tab(s) ( 81 mg ), PO, Daily, # 30 tab(s), 0 Refill(s), Type: Soft Stop, OTC (Rx)  Suspended  atorvastatin 80 mg oral tablet: = 1 tab(s) ( 80 mg ), Oral, daily, # 90 tab(s), 1 Refill(s), Type: Maintenance, Pharmacy: [facility], 1 tab(s) Oral daily  Documented Medications  Documented  Coenzyme Q10: ( 200 mg ), po, daily, cap(s), 0 Refill(s), Type: Maintenance  Fish Oil: 1 tab(s), po, bid, tab(s), 0 Refill(s), Type: Maintenance  Melatonin 3 mg oral tablet: = 1 tab(s) ( 3 mg ), Oral, 0 Refill(s), Type: Maintenance  Multiple Vitamins oral tablet: 1 tab(s), po, daily, 0 Refill(s), Type: Maintenance  Nasacort: daily, 0 Refill(s), Type: Maintenance  glucosamine: PO,  Daily, 0,    Medications          *denotes recorded medication          aspirin 81 mg oral tablet: 81 mg, 1 tab(s), PO, Daily, 30 tab(s).          *glucosamine: PO, Daily.          *Melatonin 3 mg oral tablet: 3 mg, 1 tab(s), Oral, 0 Refill(s).          *Multiple Vitamins oral tablet: 1 tab(s), po, daily.          *Fish Oil: 1 tab(s), po, bid, tab(s).          Flomax 0.4 mg oral capsule: 0.4 mg, 1 cap(s), Oral, daily, 90 cap(s), 1 Refill(s).          *Nasacort: daily.          *Coenzyme Q10: 200 mg, po, daily, cap(s).       Problem list:    All Problems  ASHD (arteriosclerotic heart disease) / SNOMED CT 93345313 / Confirmed  HLD (hyperlipidemia) / SNOMED CT 638927476 / Confirmed  Prediabetes / SNOMED CT 84444756 / Confirmed  Joint pain / SNOMED CT 98313550 / Confirmed  Kidney stone / SNOMED CT 069168897 / Confirmed  Tennis elbow / SNOMED CT 906938711 / Confirmed  Seasonal allergies / SNOMED CT 159147650 / Confirmed  Urinary incontinence / SNOMED CT 2169091122 / Confirmed  Resolved: History of chicken pox / SNOMED CT 104219219  Canceled: Hx of abnormal cardiac calcium score      Histories   Past Medical History:    Active  Tennis elbow (964581192)  ASHD (arteriosclerotic heart disease) (12845832)  HLD (hyperlipidemia) (012781576)  Seasonal allergies (518527650)  Resolved  History of chicken pox (081690519):  Resolved.   Family History:    Diabetes mellitus  Father (Randy)  Hypertension  Mother (Erika)  Heart disease  Mother (Erika)  Cancer of colon  Grandfather (P)  Comments:  4/19/2011 7:27 PM CDT - Elba Lindsay  80s  Crohn's disease  Sister (Anjelica)  MI - Myocardial infarction  Uncle (P)  Comments:  4/19/2011 7:27 PM CDT - Elba Lindsay  50s  Father (Randy)  Comments:  4/19/2011 7:29 PM CDT - Elba Lindsay  early 50s  ASHD - Atherosclerotic heart disease  Father (Randy)     Procedure history:    Colonoscopy (SNOMED CT 732558899) performed by Nacho Villalobos MD on 7/25/2017 at 51 Years.  Comments:  8/1/2017 10:38  AM CDT - Silvana Vega RN  Indication: screening  Sedation: fentanyl and versed  Findings: normal colonoscopy  F/U: 10 years  Cardiac computed tomography for calcium scoring (SNOMED CT 6250177631) on 5/17/2017 at 51 Years.  Comments:  5/19/2017 2:23 PM CDT - Marly Harden  Total coronary calcium score is 182  Excision of sebaceous cyst (SNOMED CT 331889155) performed by Kyle Pike MD on 6/16/2016 at 50 Years.  Comments:  6/21/2016 1:14 PM CDT - Maisha Mchugh  Chest.  CT Cardiac Calcium Score on 11/17/2008 at 43 Years.  Comments:  5/7/2013 9:10 AM CDT - Briseida Romero  Date of test: 11/17/08.  Score: 159   Social History:        Alcohol Assessment            Current, Beer (12 oz), 1 time per week, 1 drinks/episode average.  2 drinks/episode maximum.      Tobacco Assessment            Never      Substance Abuse Assessment            Never      Employment and Education Assessment            Employed, Work/School description: Insurance Claim Supervisor.      Home and Environment Assessment            Marital status: .  Spouse/Partner name: Corina pandya.  Risks in environment: owns secured gun.      Nutrition and Health Assessment            Type of diet: Regular.      Exercise and Physical Activity Assessment            Exercise frequency: Daily.  Exercise type: Walking.      Sexual Assessment            Sexually active: Yes.       Has  no history of anemia.  Has no history of DVT or pulmonary embolism.  Has  no personal history of bleeding problems.   Has no personal or family history of anesthesia reactions.  Patient does not have active tuberculosis.    S/he has  taken aspirin or aspirin containing products in the last week.     S/he has not taken Plavix (Clopidogrel) in the last 2 weeks.    S/he has not taken warfarin in the past week.    S/he  has not been on corticosteroids for more than 2 weeks recently.      S/he  is not DNR before, during or after surgery.    Chest pain / SOB walking up 2  flights of steps? No  Pain in neck or jaw? No  CAD MI?  Yes controlled for many yearsand no symptoms  Afib? No  Heart Failure? No  Asthma  or Bronchitis? No  Diabetes? No       Insulin/Orals?   Seizure Disorder? No  CKD? No  Thyroid Disease? No  Liver Disease no  CVA? No         Physical Examination   Vital Signs   12/3/2019 4:51 PM CST Temperature Tympanic 97.1 DegF  LOW    Peripheral Pulse Rate 66 bpm    HR Method Electronic    Systolic Blood Pressure 115 mmHg    Diastolic Blood Pressure 75 mmHg    Mean Arterial Pressure 88 mmHg    BP Method Electronic      Measurements from flowsheet : Measurements   12/3/2019 4:51 PM CST Height Measured - Standard 65.5 in    Weight Measured - Standard 174.0 lb    BSA 1.91 m2    Body Mass Index 28.51 kg/m2  HI      General:  Alert and oriented, No acute distress.    Eye:  Pupils are equal, round and reactive to light, Extraocular movements are intact.    HENT:  Normocephalic, Tympanic membranes are clear, Normal hearing, Oral mucosa is moist.    Neck:  Supple, Non-tender, No carotid bruit, No jugular venous distention, No lymphadenopathy, No thyromegaly.    Respiratory:  Lungs are clear to auscultation, Respirations are non-labored, Breath sounds are equal.    Cardiovascular:  Normal rate, Regular rhythm, No murmur, Good pulses equal in all extremities, No edema.    Gastrointestinal:  Soft, Non-tender, Non-distended, Normal bowel sounds, No organomegaly.    Musculoskeletal:  Normal range of motion, Normal strength, No tenderness, No swelling, No deformity.    Integumentary:  Warm, Dry.    Neurologic:  Alert, Oriented, Normal sensory, Normal motor function, No focal deficits, Cranial Nerves II-XII are grossly intact, Normal deep tendon reflexes.    Psychiatric:  Cooperative, Appropriate mood & affect, Normal judgment.       Review / Management   (      Impression and Plan   Diagnosis     Achilles tendinitis (VRF47-HO M76.60).     Condition:  Okay for surgery ASA 2.

## 2022-02-15 NOTE — NURSING NOTE
Comprehensive Intake Entered On:  12/11/2020 11:49 AM CST    Performed On:  12/11/2020 11:47 AM CST by Briseida Pope               Summary   Chief Complaint :   chronic disease visit   Weight Measured :   174.6 lb(Converted to: 174 lb 10 oz, 79.197 kg)    Height Measured :   65.5 in(Converted to: 5 ft 5 in, 166.37 cm)    Body Mass Index :   28.61 kg/m2 (HI)    Body Surface Area :   1.91 m2   Systolic Blood Pressure :   127 mmHg   Diastolic Blood Pressure :   77 mmHg   Mean Arterial Pressure :   94 mmHg   Peripheral Pulse Rate :   66 bpm   BP Method :   Electronic   HR Method :   Electronic   Briseida Pope - 12/11/2020 11:47 AM CST   Health Status   Allergies Verified? :   Yes   Medication History Verified? :   Yes   Pre-Visit Planning Status :   Completed   Tobacco Use? :   Never smoker   Briseida Pope - 12/11/2020 11:47 AM CST   Meds / Allergies   (As Of: 12/11/2020 11:49:29 AM CST)   Allergies (Active)   No known allergies  Estimated Onset Date:   Unspecified ; Created By:   Nora Cerda CMA; Reaction Status:   Active ; Category:   Drug ; Substance:   No known allergies ; Type:   Allergy ; Updated By:   Nora Cerda CMA; Reviewed Date:   5/22/2020 10:12 AM CDT        Medication List   (As Of: 12/11/2020 11:49:29 AM CST)   Prescription/Discharge Order    atorvastatin  :   atorvastatin ; Status:   Prescribed ; Ordered As Mnemonic:   atorvastatin 80 mg oral tablet ; Simple Display Line:   1 tab(s), Oral, daily, 90 tab(s), 3 Refill(s) ; Ordering Provider:   David Sandra MD; Catalog Code:   atorvastatin ; Order Dt/Tm:   5/22/2020 10:34:19 AM CDT          tamsulosin  :   tamsulosin ; Status:   Prescribed ; Ordered As Mnemonic:   Flomax 0.4 mg oral capsule ; Simple Display Line:   0.4 mg, 1 cap(s), Oral, daily, 90 cap(s), 3 Refill(s) ; Ordering Provider:   David Sandra MD; Catalog Code:   tamsulosin ; Order Dt/Tm:   3/20/2020 8:31:53 AM CDT          aspirin  :   aspirin ; Status:    Prescribed ; Ordered As Mnemonic:   aspirin 81 mg oral tablet ; Simple Display Line:   81 mg, 1 tab(s), PO, Daily, 30 tab(s) ; Ordering Provider:   David Sandra MD; Catalog Code:   aspirin ; Order Dt/Tm:   5/31/2012 3:22:03 PM CDT            Home Meds    melatonin  :   melatonin ; Status:   Documented ; Ordered As Mnemonic:   Melatonin 3 mg oral tablet ; Simple Display Line:   3 mg, 1 tab(s), Oral, 0 Refill(s) ; Catalog Code:   melatonin ; Order Dt/Tm:   5/10/2019 9:07:32 AM CDT          multivitamin  :   multivitamin ; Status:   Documented ; Ordered As Mnemonic:   Multiple Vitamins oral tablet ; Simple Display Line:   1 tab(s), po, daily ; Catalog Code:   multivitamin ; Order Dt/Tm:   4/29/2015 8:53:31 AM CDT          triamcinolone nasal  :   triamcinolone nasal ; Status:   Documented ; Ordered As Mnemonic:   Nasacort ; Simple Display Line:   daily ; Catalog Code:   triamcinolone nasal ; Order Dt/Tm:   5/8/2014 1:00:16 PM CDT          ubiquinone  :   ubiquinone ; Status:   Documented ; Ordered As Mnemonic:   Coenzyme Q10 ; Simple Display Line:   200 mg, po, daily, cap(s) ; Catalog Code:   ubiquinone ; Order Dt/Tm:   4/21/2011 9:04:54 AM CDT          glucosamine  :   glucosamine ; Status:   Documented ; Ordered As Mnemonic:   glucosamine ; Simple Display Line:   PO, Daily ; Catalog Code:   glucosamine ; Order Dt/Tm:   2/3/2010 1:36:13 PM CST          omega-3 polyunsaturated fatty acids  :   omega-3 polyunsaturated fatty acids ; Status:   Documented ; Ordered As Mnemonic:   Fish Oil ; Simple Display Line:   1 tab(s), po, bid, tab(s) ; Catalog Code:   omega-3 polyunsaturated fatty acids ; Order Dt/Tm:   2/3/2010 1:36:04 PM CST            ID Risk Screen   Recent Travel History :   No recent travel   Family Member Travel History :   No recent travel   Other Exposure to Infectious Disease :   Unknown   Briseida Pope - 12/11/2020 11:47 AM CST   Social History   Social History   (As Of: 12/11/2020 11:49:29 AM  CST)   Alcohol:        Current, Beer (12 oz), 1 time per week, 1 drinks/episode average.  2 drinks/episode maximum.   (Last Updated: 5/4/2017 2:13:34 PM CDT by Sarita Vallecillo)          Tobacco:        Never (less than 100 in lifetime)   (Last Updated: 12/11/2020 11:49:09 AM CST by Briseida Pope)          Electronic Cigarette/Vaping:        Electronic Cigarette Use: Never.   (Last Updated: 12/11/2020 11:49:15 AM CST by Briseida Pope)          Substance Abuse:        Never   (Last Updated: 5/8/2014 5:11:27 PM CDT by Elba Lindsay)          Employment/School:        Employed, Work/School description: [employer] Claim [employer].   (Last Updated: 5/18/2016 2:55:20 PM CDT by Marly Harden)          Home/Environment:        Marital status: .  Spouse/Partner name: Corina pandya.  Risks in environment: owns secured gun.   (Last Updated: 5/4/2017 2:13:46 PM CDT by Sarita Vallecillo)          Nutrition/Health:        Type of diet: Regular.   (Last Updated: 4/29/2015 10:01:23 AM CDT by Briseida Pope)          Exercise:        Exercise frequency: Daily.  Exercise type: Walking.   (Last Updated: 5/18/2016 2:56:02 PM CDT by Marly Harden)          Sexual:        Sexually active: Yes.   (Last Updated: 5/8/2014 5:13:33 PM CDT by Elba Lindsay)

## 2022-02-15 NOTE — LETTER
(Inserted Image. Unable to display)       December 05, 2019      YASMIN SILVESTRE  [address] [place], [place] [id number]          Dear YASMIN,      Thank you for selecting [facility] for your healthcare needs.     Our records indicate you are due for the following services:     Immunizations: Shingrix #2    To schedule an appointment or if you have further questions, please contact your primary clinic:   [facility]       [phone]   [facility]       [phone]              [facility]     [phone]    Powered by [employer]    Sincerely,    David Sandra MD

## 2022-02-15 NOTE — NURSING NOTE
Comprehensive Intake Entered On:  12/3/2019 4:53 PM CST    Performed On:  12/3/2019 4:51 PM CST by Briseida Pope               Summary   Chief Complaint :   Preop.  Left achiles tendon repair.  [facility] / [facility]. Dr. Zhu   Weight Measured :   174.0 lb(Converted to: 174 lb 0 oz, 78.93 kg)    Height Measured :   65.5 in(Converted to: 5 ft 5 in, 166.37 cm)    Body Mass Index :   28.51 kg/m2 (HI)    Body Surface Area :   1.91 m2   Systolic Blood Pressure :   115 mmHg   Diastolic Blood Pressure :   75 mmHg   Mean Arterial Pressure :   88 mmHg   Peripheral Pulse Rate :   66 bpm   BP Method :   Electronic   HR Method :   Electronic   Temperature Tympanic :   97.1 DegF(Converted to: 36.2 DegC)  (LOW)    Briseida Pope - 12/3/2019 4:51 PM CST   Health Status   Allergies Verified? :   Yes   Medication History Verified? :   Yes   Pre-Visit Planning Status :   Completed   Tobacco Use? :   Never smoker   Briseida Pope - 12/3/2019 4:51 PM CST   Meds / Allergies   (As Of: 12/3/2019 4:53:53 PM CST)   Allergies (Active)   No known allergies  Estimated Onset Date:   Unspecified ; Created By:   Nora Cerda; Reaction Status:   Active ; Category:   Drug ; Substance:   No known allergies ; Type:   Allergy ; Updated By:   Nora Cerda; Reviewed Date:   5/10/2019 9:06 AM CDT        Medication List   (As Of: 12/3/2019 4:53:53 PM CST)   Prescription/Discharge Order    tamsulosin  :   tamsulosin ; Status:   Prescribed ; Ordered As Mnemonic:   Flomax 0.4 mg oral capsule ; Simple Display Line:   0.4 mg, 1 cap(s), Oral, daily, 90 cap(s), 1 Refill(s) ; Ordering Provider:   David Sandra MD; Catalog Code:   tamsulosin ; Order Dt/Tm:   10/3/2019 10:50:18 AM CDT          alfuzosin  :   alfuzosin ; Status:   Processing ; Ordered As Mnemonic:   alfuzosin 10 mg oral tablet, extended release ; Ordering Provider:   David Sandra MD; Action Display:   Complete ; Catalog Code:   alfuzosin ; Order Dt/Tm:    12/3/2019 4:52:37 PM CST          atorvastatin  :   atorvastatin ; Status:   Suspended ; Ordered As Mnemonic:   atorvastatin 80 mg oral tablet ; Simple Display Line:   80 mg, 1 tab(s), Oral, daily, 90 tab(s), 1 Refill(s) ; Ordering Provider:   David Sandra MD; Catalog Code:   atorvastatin ; Order Dt/Tm:   5/10/2019 10:17:59 AM CDT          aspirin  :   aspirin ; Status:   Prescribed ; Ordered As Mnemonic:   aspirin 81 mg oral tablet ; Simple Display Line:   81 mg, 1 tab(s), PO, Daily, 30 tab(s) ; Ordering Provider:   David Sandra MD; Catalog Code:   aspirin ; Order Dt/Tm:   5/31/2012 3:22:03 PM CDT            Home Meds    melatonin  :   melatonin ; Status:   Documented ; Ordered As Mnemonic:   Melatonin 3 mg oral tablet ; Simple Display Line:   3 mg, 1 tab(s), Oral, 0 Refill(s) ; Catalog Code:   melatonin ; Order Dt/Tm:   5/10/2019 9:07:32 AM CDT          multivitamin  :   multivitamin ; Status:   Documented ; Ordered As Mnemonic:   Multiple Vitamins oral tablet ; Simple Display Line:   1 tab(s), po, daily ; Catalog Code:   multivitamin ; Order Dt/Tm:   4/29/2015 8:53:31 AM CDT          triamcinolone nasal  :   triamcinolone nasal ; Status:   Documented ; Ordered As Mnemonic:   Nasacort ; Simple Display Line:   daily ; Catalog Code:   triamcinolone nasal ; Order Dt/Tm:   5/8/2014 1:00:16 PM CDT          ubiquinone  :   ubiquinone ; Status:   Documented ; Ordered As Mnemonic:   Coenzyme Q10 ; Simple Display Line:   200 mg, po, daily, cap(s) ; Catalog Code:   ubiquinone ; Order Dt/Tm:   4/21/2011 9:04:54 AM CDT          glucosamine  :   glucosamine ; Status:   Documented ; Ordered As Mnemonic:   glucosamine ; Simple Display Line:   PO, Daily ; Catalog Code:   glucosamine ; Order Dt/Tm:   2/3/2010 1:36:13 PM CST          omega-3 polyunsaturated fatty acids  :   omega-3 polyunsaturated fatty acids ; Status:   Documented ; Ordered As Mnemonic:   Fish Oil ; Simple Display Line:   1 tab(s), po, bid, tab(s) ;  Catalog Code:   omega-3 polyunsaturated fatty acids ; Order Dt/Tm:   2/3/2010 1:36:04 PM CST

## 2022-02-15 NOTE — NURSING NOTE
Comprehensive Intake Entered On:  2/15/2020 8:15 AM CST    Performed On:  2/15/2020 8:13 AM CST by Briseida Pope               Summary   Chief Complaint :   Possible infected surgical site left foot.  Surgery was 12/17/19 for achelles tendon repair   Weight Measured :   177.2 lb(Converted to: 177 lb 3 oz, 80.38 kg)    Height Measured :   65.5 in(Converted to: 5 ft 5 in, 166.37 cm)    Body Mass Index :   29.04 kg/m2 (HI)    Body Surface Area :   1.93 m2   Systolic Blood Pressure :   122 mmHg   Diastolic Blood Pressure :   72 mmHg   Mean Arterial Pressure :   89 mmHg   Peripheral Pulse Rate :   67 bpm   Temperature Tympanic :   97.4 DegF(Converted to: 36.3 DegC)  (LOW)    Oxygen Saturation :   98 %   Briseida Pope - 2/15/2020 8:13 AM CST   Health Status   Allergies Verified? :   Yes   Medication History Verified? :   Yes   Tobacco Use? :   Never smoker   Briseida Pope - 2/15/2020 8:13 AM CST

## 2022-02-15 NOTE — TELEPHONE ENCOUNTER
Entered by Jazmín Mccann on April 10, 2019 10:42:17 AM CDT  ---------------------  From: Jazmín Mccann   To: [facility]    Sent: 4/10/2019 10:42:17 AM CDT  Subject: Medication Management     ** Submitted: **  Order:alfuzosin (alfuzosin 10 mg oral tablet, extended release)  1 tab(s)  Oral  daily  due 5/8/19 for annual physical  Qty:  90 tab(s)        Days Supply:  0        Refills:  0          Substitutions Allowed     Route To Pharmacy - [facility]    Signed by Jazmín Mccann  4/10/2019 10:41:00 AM    ** Submitted: **  Complete:alfuzosin (Uroxatral 10 mg oral tablet, extended release)   Signed by Jazmín Mccann  4/10/2019 10:42:00 AM    ** Not Approved:  **  alfuzosin (ALFUZOSIN HCL ER TABS 10MG)  TAKE 1 TABLET DAILY  Qty:  90 tab(s)        Days Supply:  0        Refills:  3          Substitutions Allowed     Route To Pharmacy - [facility]   Signed by Jazmín Mccann          Date of last office visit and reason:  6/14/18 kidney stone      Date of last Med Check / Px:   5/8/18  Date of last labs pertaining to med:  _    RTC order in chart:  yes, due next month for px.     will fill until then.     For Protocol refill, has patient been contacted:  letter        ------------------------------------------  From: [facility]  To: David Sandra MD  Sent: April 10, 2019 12:04:20 AM CDT  Subject: Medication Management  Due: April 11, 2019 12:04:20 AM CDT    ** On Hold Pending Signature **  Drug: alfuzosin (Uroxatral 10 mg oral tablet, extended release)  1 TAB(S) PO DAILY  Quantity: 90 tab(s)     Days Supply: 0         Refills: 3  Substitutions Allowed  Notes from Pharmacy:     Dispensed Drug: alfuzosin (alfuzosin 10 mg oral tablet, extended release)  TAKE 1 TABLET DAILY  Quantity: 90 tab(s)     Days Supply: 0         Refills: 3  Substitutions Allowed  Notes from Pharmacy:   ------------------------------------------

## 2022-02-15 NOTE — CARE COORDINATION
Pt appears on TFS chronic disease panel as out of parameters for elevated BP.  Pt was seen 6/14/2018 having Kidney stones, Pt has RTC for AWV 5/2019  Pt does not have HTN DX. Sent RTC CSS BP to pt via portal again.  If no rsp next list cycle, call pt.

## 2022-02-15 NOTE — TELEPHONE ENCOUNTER
---------------------  From: David Sandra MD   To: YASMIN SILVESTRE    Sent: 5/24/2020 10:33:12 AM CDT  Subject: Patient Message - Results Notification        All labs acceptable.  Please let us know you received this message by either selecting Forward or Reply at the top.  Thank you    Results:  Date Result Name Ind Value Ref Range   5/22/2020 10:07 AM Hgb A1c ((H)) 6.0 ( - <5.7)   5/22/2020 10:07 AM Cholesterol  121 mg/dL ( - <200)   5/22/2020 10:07 AM HDL  66 mg/dL (> OR = 40 - )   5/22/2020 10:07 AM LDL  42    5/22/2020 10:07 AM Triglyceride  46 mg/dL ( - <150)

## 2022-04-06 ENCOUNTER — TRANSFERRED RECORDS (OUTPATIENT)
Dept: HEALTH INFORMATION MANAGEMENT | Facility: CLINIC | Age: 57
End: 2022-04-06

## 2022-05-04 ENCOUNTER — TRANSFERRED RECORDS (OUTPATIENT)
Dept: HEALTH INFORMATION MANAGEMENT | Facility: CLINIC | Age: 57
End: 2022-05-04